# Patient Record
Sex: MALE | Race: WHITE | NOT HISPANIC OR LATINO | Employment: OTHER | URBAN - METROPOLITAN AREA
[De-identification: names, ages, dates, MRNs, and addresses within clinical notes are randomized per-mention and may not be internally consistent; named-entity substitution may affect disease eponyms.]

---

## 2022-02-10 ENCOUNTER — APPOINTMENT (OUTPATIENT)
Dept: CT IMAGING | Facility: CLINIC | Age: 72
DRG: 536 | End: 2022-02-10
Attending: PHYSICIAN ASSISTANT
Payer: MEDICARE

## 2022-02-10 ENCOUNTER — APPOINTMENT (OUTPATIENT)
Dept: GENERAL RADIOLOGY | Facility: CLINIC | Age: 72
DRG: 536 | End: 2022-02-10
Attending: PHYSICIAN ASSISTANT
Payer: MEDICARE

## 2022-02-10 ENCOUNTER — HOSPITAL ENCOUNTER (INPATIENT)
Facility: CLINIC | Age: 72
LOS: 4 days | Discharge: NURSING, REHAB OR LONG-TERM CARE | DRG: 536 | End: 2022-02-14
Attending: PHYSICIAN ASSISTANT | Admitting: STUDENT IN AN ORGANIZED HEALTH CARE EDUCATION/TRAINING PROGRAM
Payer: MEDICARE

## 2022-02-10 DIAGNOSIS — S72.112A CLOSED DISPLACED FRACTURE OF GREATER TROCHANTER OF LEFT FEMUR, INITIAL ENCOUNTER (H): ICD-10-CM
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
## 2022-02-10 DIAGNOSIS — F41.1 GENERALIZED ANXIETY DISORDER: Primary | ICD-10-CM

## 2022-02-10 PROBLEM — F33.8 SEASONAL AFFECTIVE DISORDER (H): Status: ACTIVE | Noted: 2018-01-22

## 2022-02-10 PROBLEM — C61 MALIGNANT NEOPLASM OF PROSTATE (H): Status: ACTIVE | Noted: 2019-11-11

## 2022-02-10 PROBLEM — S32.000A COMPRESSION FRACTURE OF LUMBAR VERTEBRA (H): Status: ACTIVE | Noted: 2017-03-15

## 2022-02-10 PROBLEM — N50.1 MALE PELVIC HEMATOMA: Status: ACTIVE | Noted: 2020-02-26

## 2022-02-10 PROBLEM — G90.9 AUTONOMIC NEUROPATHY: Status: ACTIVE | Noted: 2018-05-07

## 2022-02-10 PROBLEM — G47.09 OTHER INSOMNIA: Status: ACTIVE | Noted: 2018-11-06

## 2022-02-10 PROBLEM — M48.061 SPINAL STENOSIS OF LUMBAR REGION: Status: ACTIVE | Noted: 2017-03-15

## 2022-02-10 LAB
ANION GAP SERPL CALCULATED.3IONS-SCNC: 5 MMOL/L (ref 3–14)
BASOPHILS # BLD AUTO: 0 10E3/UL (ref 0–0.2)
BASOPHILS NFR BLD AUTO: 1 %
BUN SERPL-MCNC: 16 MG/DL (ref 7–30)
CALCIUM SERPL-MCNC: 8.9 MG/DL (ref 8.5–10.1)
CHLORIDE BLD-SCNC: 99 MMOL/L (ref 94–109)
CO2 SERPL-SCNC: 29 MMOL/L (ref 20–32)
CREAT SERPL-MCNC: 0.7 MG/DL (ref 0.66–1.25)
EOSINOPHIL # BLD AUTO: 0.1 10E3/UL (ref 0–0.7)
EOSINOPHIL NFR BLD AUTO: 1 %
ERYTHROCYTE [DISTWIDTH] IN BLOOD BY AUTOMATED COUNT: 11.8 % (ref 10–15)
GFR SERPL CREATININE-BSD FRML MDRD: >90 ML/MIN/1.73M2
GLUCOSE BLD-MCNC: 97 MG/DL (ref 70–99)
HCT VFR BLD AUTO: 40.1 % (ref 40–53)
HGB BLD-MCNC: 13.5 G/DL (ref 13.3–17.7)
IMM GRANULOCYTES # BLD: 0 10E3/UL
IMM GRANULOCYTES NFR BLD: 0 %
LYMPHOCYTES # BLD AUTO: 0.8 10E3/UL (ref 0.8–5.3)
LYMPHOCYTES NFR BLD AUTO: 11 %
MCH RBC QN AUTO: 31.5 PG (ref 26.5–33)
MCHC RBC AUTO-ENTMCNC: 33.7 G/DL (ref 31.5–36.5)
MCV RBC AUTO: 94 FL (ref 78–100)
MONOCYTES # BLD AUTO: 0.5 10E3/UL (ref 0–1.3)
MONOCYTES NFR BLD AUTO: 7 %
NEUTROPHILS # BLD AUTO: 6.1 10E3/UL (ref 1.6–8.3)
NEUTROPHILS NFR BLD AUTO: 80 %
NRBC # BLD AUTO: 0 10E3/UL
NRBC BLD AUTO-RTO: 0 /100
PLATELET # BLD AUTO: 199 10E3/UL (ref 150–450)
POTASSIUM BLD-SCNC: 4.1 MMOL/L (ref 3.4–5.3)
RBC # BLD AUTO: 4.28 10E6/UL (ref 4.4–5.9)
SARS-COV-2 RNA RESP QL NAA+PROBE: NEGATIVE
SODIUM SERPL-SCNC: 133 MMOL/L (ref 133–144)
WBC # BLD AUTO: 7.5 10E3/UL (ref 4–11)

## 2022-02-10 PROCEDURE — 72100 X-RAY EXAM L-S SPINE 2/3 VWS: CPT

## 2022-02-10 PROCEDURE — 80048 BASIC METABOLIC PNL TOTAL CA: CPT | Performed by: PHYSICIAN ASSISTANT

## 2022-02-10 PROCEDURE — C9803 HOPD COVID-19 SPEC COLLECT: HCPCS

## 2022-02-10 PROCEDURE — 250N000013 HC RX MED GY IP 250 OP 250 PS 637: Performed by: STUDENT IN AN ORGANIZED HEALTH CARE EDUCATION/TRAINING PROGRAM

## 2022-02-10 PROCEDURE — 99220 PR INITIAL OBSERVATION CARE,LEVEL III: CPT | Performed by: STUDENT IN AN ORGANIZED HEALTH CARE EDUCATION/TRAINING PROGRAM

## 2022-02-10 PROCEDURE — 72192 CT PELVIS W/O DYE: CPT

## 2022-02-10 PROCEDURE — 87635 SARS-COV-2 COVID-19 AMP PRB: CPT | Performed by: PHYSICIAN ASSISTANT

## 2022-02-10 PROCEDURE — G0378 HOSPITAL OBSERVATION PER HR: HCPCS

## 2022-02-10 PROCEDURE — 36415 COLL VENOUS BLD VENIPUNCTURE: CPT | Performed by: PHYSICIAN ASSISTANT

## 2022-02-10 PROCEDURE — 120N000001 HC R&B MED SURG/OB

## 2022-02-10 PROCEDURE — 99285 EMERGENCY DEPT VISIT HI MDM: CPT | Mod: 25

## 2022-02-10 PROCEDURE — 73502 X-RAY EXAM HIP UNI 2-3 VIEWS: CPT

## 2022-02-10 PROCEDURE — 85025 COMPLETE CBC W/AUTO DIFF WBC: CPT | Performed by: PHYSICIAN ASSISTANT

## 2022-02-10 RX ORDER — METHOCARBAMOL 500 MG/1
1000 TABLET, FILM COATED ORAL AT BEDTIME
Status: DISCONTINUED | OUTPATIENT
Start: 2022-02-10 | End: 2022-02-11

## 2022-02-10 RX ORDER — ACETAMINOPHEN 650 MG/1
650 SUPPOSITORY RECTAL EVERY 6 HOURS PRN
Status: DISCONTINUED | OUTPATIENT
Start: 2022-02-10 | End: 2022-02-14 | Stop reason: HOSPADM

## 2022-02-10 RX ORDER — ONDANSETRON 2 MG/ML
4 INJECTION INTRAMUSCULAR; INTRAVENOUS EVERY 6 HOURS PRN
Status: DISCONTINUED | OUTPATIENT
Start: 2022-02-10 | End: 2022-02-14 | Stop reason: HOSPADM

## 2022-02-10 RX ORDER — METHOCARBAMOL 500 MG/1
1000 TABLET, FILM COATED ORAL AT BEDTIME
Status: ON HOLD | COMMUNITY
End: 2022-02-14

## 2022-02-10 RX ORDER — CLONAZEPAM 0.5 MG/1
.5-1 TABLET ORAL AT BEDTIME
Status: DISCONTINUED | OUTPATIENT
Start: 2022-02-10 | End: 2022-02-14 | Stop reason: HOSPADM

## 2022-02-10 RX ORDER — NALOXONE HYDROCHLORIDE 0.4 MG/ML
0.4 INJECTION, SOLUTION INTRAMUSCULAR; INTRAVENOUS; SUBCUTANEOUS
Status: DISCONTINUED | OUTPATIENT
Start: 2022-02-10 | End: 2022-02-14 | Stop reason: HOSPADM

## 2022-02-10 RX ORDER — ACETAMINOPHEN 325 MG/1
650 TABLET ORAL EVERY 6 HOURS PRN
Status: DISCONTINUED | OUTPATIENT
Start: 2022-02-10 | End: 2022-02-14 | Stop reason: HOSPADM

## 2022-02-10 RX ORDER — CLONAZEPAM 0.5 MG/1
.5-1 TABLET ORAL AT BEDTIME
Status: ON HOLD | COMMUNITY
End: 2022-02-12

## 2022-02-10 RX ORDER — ONDANSETRON 4 MG/1
4 TABLET, ORALLY DISINTEGRATING ORAL EVERY 6 HOURS PRN
Status: DISCONTINUED | OUTPATIENT
Start: 2022-02-10 | End: 2022-02-14 | Stop reason: HOSPADM

## 2022-02-10 RX ORDER — NALOXONE HYDROCHLORIDE 0.4 MG/ML
0.2 INJECTION, SOLUTION INTRAMUSCULAR; INTRAVENOUS; SUBCUTANEOUS
Status: DISCONTINUED | OUTPATIENT
Start: 2022-02-10 | End: 2022-02-14 | Stop reason: HOSPADM

## 2022-02-10 RX ORDER — LIDOCAINE 40 MG/G
CREAM TOPICAL
Status: DISCONTINUED | OUTPATIENT
Start: 2022-02-10 | End: 2022-02-14 | Stop reason: HOSPADM

## 2022-02-10 RX ORDER — NORTRIPTYLINE HYDROCHLORIDE 50 MG/1
150 CAPSULE ORAL AT BEDTIME
Status: DISCONTINUED | OUTPATIENT
Start: 2022-02-10 | End: 2022-02-14 | Stop reason: HOSPADM

## 2022-02-10 RX ORDER — NORTRIPTYLINE HYDROCHLORIDE 75 MG/1
150 CAPSULE ORAL AT BEDTIME
COMMUNITY

## 2022-02-10 RX ORDER — OXYCODONE HYDROCHLORIDE 5 MG/1
5 TABLET ORAL EVERY 4 HOURS PRN
Status: DISCONTINUED | OUTPATIENT
Start: 2022-02-10 | End: 2022-02-14

## 2022-02-10 RX ADMIN — METHOCARBAMOL 1000 MG: 500 TABLET ORAL at 23:09

## 2022-02-10 RX ADMIN — CLONAZEPAM 1 MG: 0.5 TABLET ORAL at 01:27

## 2022-02-10 RX ADMIN — ACETAMINOPHEN 650 MG: 325 TABLET, FILM COATED ORAL at 23:09

## 2022-02-10 ASSESSMENT — ENCOUNTER SYMPTOMS
HEADACHES: 0
NUMBNESS: 0
WEAKNESS: 0
NECK PAIN: 0
BACK PAIN: 1

## 2022-02-11 ENCOUNTER — APPOINTMENT (OUTPATIENT)
Dept: PHYSICAL THERAPY | Facility: CLINIC | Age: 72
DRG: 536 | End: 2022-02-11
Attending: STUDENT IN AN ORGANIZED HEALTH CARE EDUCATION/TRAINING PROGRAM
Payer: MEDICARE

## 2022-02-11 PROCEDURE — 99226 PR SUBSEQUENT OBSERVATION CARE,LEVEL III: CPT | Performed by: HOSPITALIST

## 2022-02-11 PROCEDURE — G0378 HOSPITAL OBSERVATION PER HR: HCPCS

## 2022-02-11 PROCEDURE — 97161 PT EVAL LOW COMPLEX 20 MIN: CPT | Mod: GP

## 2022-02-11 PROCEDURE — 250N000013 HC RX MED GY IP 250 OP 250 PS 637: Performed by: HOSPITALIST

## 2022-02-11 PROCEDURE — 97530 THERAPEUTIC ACTIVITIES: CPT | Mod: GP

## 2022-02-11 PROCEDURE — 120N000001 HC R&B MED SURG/OB

## 2022-02-11 PROCEDURE — 250N000013 HC RX MED GY IP 250 OP 250 PS 637: Performed by: STUDENT IN AN ORGANIZED HEALTH CARE EDUCATION/TRAINING PROGRAM

## 2022-02-11 PROCEDURE — 93005 ELECTROCARDIOGRAM TRACING: CPT

## 2022-02-11 RX ORDER — METHOCARBAMOL 500 MG/1
500 TABLET, FILM COATED ORAL 4 TIMES DAILY
Status: DISCONTINUED | OUTPATIENT
Start: 2022-02-11 | End: 2022-02-11

## 2022-02-11 RX ORDER — METHOCARBAMOL 500 MG/1
500 TABLET, FILM COATED ORAL
Status: COMPLETED | OUTPATIENT
Start: 2022-02-11 | End: 2022-02-11

## 2022-02-11 RX ORDER — METHOCARBAMOL 500 MG/1
500 TABLET, FILM COATED ORAL 4 TIMES DAILY PRN
Status: COMPLETED | OUTPATIENT
Start: 2022-02-11 | End: 2022-02-11

## 2022-02-11 RX ORDER — METHOCARBAMOL 500 MG/1
500 TABLET, FILM COATED ORAL 3 TIMES DAILY PRN
Status: DISCONTINUED | OUTPATIENT
Start: 2022-02-11 | End: 2022-02-12

## 2022-02-11 RX ORDER — LIDOCAINE 4 G/G
1 PATCH TOPICAL
Status: DISCONTINUED | OUTPATIENT
Start: 2022-02-11 | End: 2022-02-14 | Stop reason: HOSPADM

## 2022-02-11 RX ADMIN — METHOCARBAMOL 500 MG: 500 TABLET ORAL at 10:04

## 2022-02-11 RX ADMIN — OXYCODONE HYDROCHLORIDE 5 MG: 5 TABLET ORAL at 11:04

## 2022-02-11 RX ADMIN — OXYCODONE HYDROCHLORIDE 5 MG: 5 TABLET ORAL at 15:15

## 2022-02-11 RX ADMIN — LIDOCAINE 1 PATCH: 246 PATCH TOPICAL at 10:03

## 2022-02-11 RX ADMIN — OXYCODONE HYDROCHLORIDE 5 MG: 5 TABLET ORAL at 00:02

## 2022-02-11 RX ADMIN — METHOCARBAMOL 500 MG: 500 TABLET ORAL at 18:06

## 2022-02-11 RX ADMIN — OXYCODONE HYDROCHLORIDE 5 MG: 5 TABLET ORAL at 06:42

## 2022-02-11 RX ADMIN — ACETAMINOPHEN 650 MG: 325 TABLET, FILM COATED ORAL at 11:08

## 2022-02-11 RX ADMIN — NORTRIPTYLINE HYDROCHLORIDE 150 MG: 50 CAPSULE ORAL at 01:26

## 2022-02-11 RX ADMIN — OXYCODONE HYDROCHLORIDE 5 MG: 5 TABLET ORAL at 22:26

## 2022-02-11 NOTE — ED PROVIDER NOTES
History     Chief Complaint:  Fall (pt biba from home - slipped and fell on ice and hour PTA - c/o left hip pain, unable to bear weight. given 1mg dilaudid ivp en route. denies hitting head or loc. denies bloodthinners)       HPI   Reji Finley is a 71 year old male who presents to the emergency department after slipping on the ice and sustaining injury to his left hip.  He reports left lower back pain and left hip pain.  He reports he had neighbors come and help him get into the house and then has been unable to bear weight on the left lower extremity since that time and called EMS.  He received 1 mg of Dilaudid in route and pain improved.  He denies striking his head or loss of consciousness.  He denies neck pain.  He denies other injuries.    Allergies:  No Known Allergies     Medications:    clonazePAM (KLONOPIN) 0.5 MG tablet  medical cannabis (Patient's own supply)  methocarbamol (ROBAXIN) 500 MG tablet  nortriptyline (PAMELOR) 75 MG capsule        Past Medical History:    Past Medical History:   Diagnosis Date     Anxiety      Chronic leg pain      Peripheral neuropathy      Plantar fasciitis        Patient Active Problem List    Diagnosis Date Noted     Closed displaced fracture of greater trochanter of left femur, initial encounter (H) 02/10/2022     Priority: Medium     Fall due to slipping on ice or snow, initial encounter 02/10/2022     Priority: Medium        Past Surgical History:    Past Surgical History:   Procedure Laterality Date     COLONOSCOPY          Family History:    family history is not on file.    Social History:  Presents via EMS  PCP: Cece Vazquez     Review of Systems   Musculoskeletal: Positive for back pain (left low). Negative for neck pain.        (+) left hip pain   Neurological: Negative for syncope, weakness, numbness and headaches.   All other systems reviewed and are negative.      Physical Exam     Patient Vitals for the past 24 hrs:   BP Temp Temp src Pulse Resp  SpO2 Weight   02/10/22 1811 (!) 178/104 -- -- -- -- -- --   02/10/22 1809 -- 97.7  F (36.5  C) Oral 60 16 99 % 61.2 kg (135 lb)        Physical Exam  General: Alert, cooperative   Head:  Scalp is atraumatic.  Neck:  Normal range of motion.   CV:  Normal rate. No murmur. 2+ DT and PT pulses  Resp:  Breath sounds are clear bilaterally. Non-labored, no retractions or accessory muscle use.  GI:  Abdomen is soft, no distension, no tenderness.   MS:   Diffuse tenderness to the lateral left hip.  No overlying erythema or ecchymosis. No deformity. No midline lumbar tenderness.  Able to roll left lower leg without significant hip pain.  Skin:  Warm and dry. No rash.   Neuro:  Alert. Strength and sensation grossly intact. 5/5 strength with dorsiflexion and plantarflexion.  Sensation intact to bilateral lower extremities.  Psych:  Awake. Alert.  Appropriate interactions.      Emergency Department Course     Imaging:      CT Pelvis Bone wo Contrast   Final Result   IMPRESSION:   1.  Minimally displaced comminuted fracture of the left greater trochanter.   2.  Degenerative changes in the visualized lumbar spine with central spinal stenosis and lateral recess stenosis at L4-L5 and L5-S1.      Lumbar spine XR, 2-3 views   Final Result   IMPRESSION: Chronic L1 osteoporotic fracture with 30% height loss with anterior wedging, status post vertebroplasty. Vertebral body heights otherwise preserved. Normal alignment. Severe degenerative disc disease at L5-S1. Moderate colonic loading;    correlate clinically for constipation.      XR Pelvis w Hip Left 1 View   Final Result   IMPRESSION: Partially evaluated degenerative changes in the lumbar spine. Otherwise negative. No evidence of fracture.             Emergency Department Course:  Past medical records, nursing notes, and vitals reviewed.  I performed an exam of the patient and obtained history, as documented above.    Consulted Dr. Brower of orthopedics.    Findings and plan  explained to the patient who consents to admission. Discussed the patient with , who will admit the patient to a medical bed for further monitoring, evaluation, and treatment.     Impression & Plan      Medical Decision Making:  Reji Finley is a 71 year old male presents to the emergency department after slipping on ice and falling onto his left side.  He has been unable to bear weight prompted arrival to the emergency room.  X-ray without evidence of fracture.  Proceed with CT given the patient's inability to bear weight.  CT revealed a greater trochanter fracture.  Distal CMS intact.  He did not strike his head.  No other injuries.  Consulted Dr. Brower  of orthopedics who recommended nonsurgical care.  Given the patient lives independently, we will admit for pain management and physical therapy consult.  Patient agrees with this plan and all questions and concerns addressed prior to admission.    Diagnosis:    ICD-10-CM    1. Closed displaced fracture of greater trochanter of left femur, initial encounter (H)  S72.112A    2. Fall due to slipping on ice or snow, initial encounter  W00.9XXA         Discharge Medications:     Medication List      ASK your doctor about these medications    clonazePAM 0.5 MG tablet  Commonly known as: klonoPIN  Ask about: Which instructions should I use?             2/10/2022   Daysi Dodge PA-C, PA-C  02/10/22 2106       Daysi Quinn PA-C  02/10/22 2106

## 2022-02-11 NOTE — PROGRESS NOTES
02/11/22 1300   Quick Adds   Type of Visit Initial PT Evaluation   Living Environment   People in home alone   Current Living Arrangements house  (2 story)   Home Accessibility stairs within home   Number of Stairs, Main Entrance none   Number of Stairs, Within Home, Primary greater than 10 stairs  (20 stairs to bedroom upstairs)   Stair Railings, Within Home, Primary   (handrails only USP)   Transportation Anticipated family or friend will provide   Living Environment Comments pt will need to use stairs to get to bedroom, brother in town but no help 24/7    Self-Care   Usual Activity Tolerance good   Current Activity Tolerance moderate   Regular Exercise Yes   Exercise Amount/Frequency daily   Equipment Currently Used at Home none   Activity/Exercise/Self-Care Comment prior to admission pt was ind with all functional mobility, ADL's and IADL's    Disability/Function   Fall history within last six months yes   Number of times patient has fallen within last six months 2   Change in Functional Status Since Onset of Current Illness/Injury yes   General Information   Onset of Illness/Injury or Date of Surgery 02/10/22   Referring Physician Sergio Lamar MD   Patient/Family Therapy Goals Statement (PT) get walking again and hopefully return home   Pertinent History of Current Problem (include personal factors and/or comorbidities that impact the POC) Pt is 71 year old male admitted on 2/10/2022. He presents with hip pain after mechanical fall, Closed displaced fracture of greater trochanter of left femur   Existing Precautions/Restrictions fall   Cognition   Orientation Status (Cognition) oriented x 3   Affect/Mental Status (Cognition) WNL   Follows Commands (Cognition) WNL   Pain Assessment   Patient Currently in Pain   (Left hip pain)   Integumentary/Edema   Integumentary/Edema Comments healing scabs on Left arm from fall 10 days ago   Posture    Posture Forward head position;Protracted shoulders   Range of  Motion (ROM)   ROM Quick Adds ROM deficits secondary to pain   ROM Comment deficits with L hip secondary to pain, otherwise appears grossly WFL    Strength   Manual Muscle Testing Quick Adds Able to perform R SLR;Deficits observed during functional mobility   Strength Comments not formally assessed, deficits with LLE secondary to left hip pain from fx, rest of body appears grossly anti-gravity   Bed Mobility   Comment (Bed Mobility) with HOB elevated, supine<>sit ModA    Transfers   Transfer Safety Comments sit<>stand with FWW and Nghia    Gait/Stairs (Locomotion)   Comment (Gait/Stairs) pt unable to ambulate at this time   Balance   Balance Comments sitting EOB with use of bilat UE, standing with FWW unsteady    Sensory Examination   Sensory Perception Comments neuropathy and decreased sensation in bilat feet   Clinical Impression   Criteria for Skilled Therapeutic Intervention yes, treatment indicated   PT Diagnosis (PT) impaired gait   Influenced by the following impairments deficits with functional ROM, strength and balance, pain, all secondary to left hip greater trochanter fx    Functional limitations due to impairments decreased ind with all functional mobility, decreased activity tolerance, fall risk    Clinical Presentation Stable/Uncomplicated   Clinical Presentation Rationale PMH and clinical judgment    Clinical Decision Making (Complexity) low complexity   Therapy Frequency (PT) 5x/week   Predicted Duration of Therapy Intervention (days/wks) 4 days    Planned Therapy Interventions (PT) balance training;bed mobility training;cryotherapy;gait training;home exercise program;ROM (range of motion);stair training;strengthening;stretching;patient/family education;transfer training;progressive activity/exercise   Anticipated Equipment Needs at Discharge (PT) walker, rolling   Risk & Benefits of therapy have been explained evaluation/treatment results reviewed;care plan/treatment goals reviewed;risks/benefits  reviewed;current/potential barriers reviewed;participants voiced agreement with care plan;participants included;patient   PT Discharge Planning    PT Discharge Recommendation (DC Rec) home with assist;home with home care physical therapy;Transitional Care Facility   PT Rationale for DC Rec Pt is currently below baseline with deficits in functional ROM, strength, balance, and pain all s/p left hip fx, and all contributing to decreased functional mobility. Pt is also orthostatic and has increased dizziness with all position changes. Pt is currently not ambulating and is an assist of 1 for all functional mobility including bed mobility and sit<>stand with FWW, and pt is a fall risk. Pt lives alone and would not be able to have help 24/7 which he would require at this time. Recommend TCU to progress pt ind with mobility and safety prior to returning to previous living situation. If pt were to return home would require assist of 1-2 for 24/7 and possibly wc, and HHPT to continue to progress functional mobility and because of increased burden to leave home at this time.     PT Brief overview of current status  bed mobility: ModA, sit<>stand with FWW and Nghia    Total Evaluation Time   Total Evaluation Time (Minutes) 10

## 2022-02-11 NOTE — PHARMACY-ADMISSION MEDICATION HISTORY
Pharmacy Medication History  Admission medication history interview status for the 2/10/2022 admission is complete. See EPIC admission navigator for prior to admission medications     Location of Interview: Patient room  Medication history sources: Patient    Significant changes made to the medication list:  Added all current meds    In the past week, patient estimated taking medication this percent of the time: greater than 90%    Additional medication history information:       Medication reconciliation completed by provider prior to medication history? No    Time spent in this activity: 15 minutes    Prior to Admission medications    Medication Sig Last Dose Taking? Auth Provider   clonazePAM (KLONOPIN) 0.5 MG tablet Take 0.5-1 mg by mouth At Bedtime 2/9/2022 at HS Yes Unknown, Entered By History   medical cannabis (Patient's own supply) See Admin Instructions (The purpose of this order is to document that the patient reports taking medical cannabis.  This is not a prescription, and is not used to certify that the patient has a qualifying medical condition.)  at PRN Yes Unknown, Entered By History   methocarbamol (ROBAXIN) 500 MG tablet Take 1,000 mg by mouth At Bedtime 2/9/2022 at Unknown time Yes Unknown, Entered By History   nortriptyline (PAMELOR) 75 MG capsule Take 150 mg by mouth At Bedtime 2/9/2022 at HS Yes Unknown, Entered By History       The information provided in this note is only as accurate as the sources available at the time of update(s)

## 2022-02-11 NOTE — PROGRESS NOTES
Observation Goals  -diagnostic tests and consults completed and resulted: yes  -vital signs normal or at patient baseline: yes  -tolerating oral intake to maintain hydration: yes   -adequate pain control on oral analgesics: pending

## 2022-02-11 NOTE — PROVIDER NOTIFICATION
Paged Dr. Tate about PRN roboxin for patient, leg spasms are continuing and the roboxin is helping.    Dr. Tate put in PRN orders

## 2022-02-11 NOTE — PLAN OF CARE
-diagnostic tests and consults completed and resulted: not met  -vital signs normal or at patient baseline : met  -tolerating oral intake to maintain hydration: met  -adequate pain control on oral analgesics: partially met. Pt still having pain when moves, but improving slowly with PRN pain meds.   Nurse to notify provider when observation goals have been met and patient is ready for discharge.    New admit   Pt slipped on ice and fx L) hip, having L) hip pain, unable to bear weight on it. Found to have degeneraive changes to lumbar spine with spinal stenosis and laternal recess steonsis of L4-l5 and L5-S1. A+Ox4. Ax2 with lift due to pain. Pain controlled with ice, PRN oxycodonex2 and tylenol x1. Pt repositions self slowly.     Will continue to monitor

## 2022-02-11 NOTE — PROVIDER NOTIFICATION
MD Notification    Notified Person: MD    Notified Person Name: ALEIDA SZYMANSKI MD    Notification Date/Time:0203    Notification Interaction:AMCOM    Purpose of Notification: Pt requesting for lidocaine patch for chronic back pain     Orders Received: none    Comments:

## 2022-02-11 NOTE — H&P
[facility]    History and Physical - Hospitalist Service       Date of Admission:  2/10/2022    Assessment & Plan      Reji Finley is a 71 year old male admitted on 2/10/2022. He presents with hip pain.       Closed displaced fracture of greater trochanter of left femur, initial encounter (H)    Fall due to slipping on ice or snow, initial encounter    Assessment: Presents with left hip pain in the setting of a mechanical fall.  CT pelvis shows minimally displaced comminuted fracture of the left greater trochanter.  Degenerative changes in the visualized lumbar spine with central spinal stenosis and lateral recess stenosis at L4-L5 and L5-S1.  Orthopedic surgery was consulted in emergency department, preliminary plan is nonsurgical at this time.    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      Seasonal affective disorder (H)    Other insomnia    Generalized anxiety disorder    Major depressive disorder, recurrent episode (H)    Assessment/Plan: Continue prior to admission nortriptyline and Klonopin     Chronic leg pain  Assessment/plan: Continue prior to admission Robaxin once verified         Diet:   Regular Diet  DVT Prophylaxis: Pneumatic Compression Devices  Koehler Catheter: Not present  Central Lines: None  Cardiac Monitoring: None  Code Status:   FULL CODE    Clinically Significant Risk Factors Present on Admission                     Disposition Plan   Expected Discharge:    Anticipated discharge location:  Awaiting care coordination huddle  Delays:            The patient's care was discussed with the Patient and ED Provider.    Sergio Lamar MD  Hospitalist Service  [facility]  Securely message with the Vocera Web Console (learn more here)  Text page via [employer] Paging/Directory          ______________________________________________________________________    Chief Complaint     Hip Pain    History is obtained from the patient    History of Present Illness      Reji Finley is a 71 year old male with past medical history of anxiety, major depression, chronic leg pain, peripheral neuropathy who presents for evaluation of hip pain.    Patient reports that he was ambulating when he slipped and fell on ice on the day admission and following his fall had significant difficulties bearing weight.  He denies any loss of consciousness, he denies any head trauma, he denies being on any anticoagulation.  Ultimately given his significant pain and difficulty ambulating, he activated EMS for further assistance and evaluation.  He denies any recent chest pain or shortness of breath.  He denies any nausea/vomiting or abdominal pain.  He denies any slurred speech, localized weakness or numbness of his extremities.  He denies any bloody stools, weight loss or night sweats.  He otherwise has no other complaints this time.    Review of Systems      The 10 point Review of Systems is negative other than noted in the HPI or here.     Past Medical History      I have reviewed this patient's medical history and updated it with pertinent information if needed.   Past Medical History:   Diagnosis Date     Anxiety      Chronic leg pain      Peripheral neuropathy      Plantar fasciitis        Past Surgical History   I have reviewed this patient's surgical history and updated it with pertinent information if needed.  Past Surgical History:   Procedure Laterality Date     COLONOSCOPY         Social History   I have reviewed this patient's social history and updated it with pertinent information if needed.  Social History     Tobacco Use     Smoking status: Never Smoker     Smokeless tobacco: None   Substance Use Topics     Alcohol use: Yes     Comment: Occasionally     Drug use: No       Family History   I have  reviewed this patient's family history and updated it with pertinent information if needed.  Family History   Problem Relation Age of Onset     No Known Problems Mother      No Known Problems Father      Prior to Admission Medications   Prior to Admission Medications   Prescriptions Last Dose Informant Patient Reported? Taking?   clonazePAM (KLONOPIN) 0.5 MG tablet 2/9/2022 at HS Self Yes Yes   Sig: Take 0.5-1 mg by mouth At Bedtime   medical cannabis (Patient's own supply)  at PRN Self Yes Yes   Sig: See Admin Instructions (The purpose of this order is to document that the patient reports taking medical cannabis.  This is not a prescription, and is not used to certify that the patient has a qualifying medical condition.)   methocarbamol (ROBAXIN) 500 MG tablet 2/9/2022 at Unknown time Self Yes Yes   Sig: Take 1,000 mg by mouth At Bedtime   nortriptyline (PAMELOR) 75 MG capsule 2/9/2022 at HS Self Yes Yes   Sig: Take 150 mg by mouth At Bedtime      Facility-Administered Medications: None     Allergies   No Known Allergies    Physical Exam   Vital Signs: Temp: 97.7  F (36.5  C) Temp src: Oral BP: (!) 178/104 Pulse: 60   Resp: 16 SpO2: 99 %      Weight: 135 lbs 0 oz    Constitutional: awake, alert, cooperative, no apparent distress.   Eyes: Lids and lashes normal, pupils equal, round and reactive to light   ENT: Normocephalic, without obvious abnormality, atraumatic, sinuses nontender on palpation   Hematologic / Lymphatic: no cervical lymphadenopathy   Respiratory: CTABL   Cardiovascular: RRR with no m/r/g   GI: Normal bowel sounds, soft, non-distended, non-tender.   Skin: normal skin color, texture, turgor   Musculoskeletal: There is no redness, warmth, or swelling of the joints. Full range of motion noted.   Neurologic: Awake, alert, oriented to name, place and time. Cranial nerves II-XII are grossly intact. Motor is 5 out of 5 bilaterally. Sensory is intact.   Neuropsychiatric: normal mood and affect      Data    Data reviewed today: I reviewed all medications, new labs and imaging results over the last 24 hours. I personally reviewed the CT Pelvis / XR Pelvis / Lumbar Spine image(s) showing see below.    Most Recent 3 CBC's:Recent Labs   Lab Test 02/10/22  2024   WBC 7.5   HGB 13.5   MCV 94        Most Recent 3 BMP's:Recent Labs   Lab Test 02/10/22  2024      POTASSIUM 4.1   CHLORIDE 99   CO2 29   BUN 16   CR 0.70   ANIONGAP 5   RADHA 8.9   GLC 97     Most Recent 2 LFT's:No lab results found.  Most Recent 3 INR's:No lab results found.  Recent Results (from the past 24 hour(s))   XR Pelvis w Hip Left 1 View    Narrative    EXAM: XR PELVIS AND HIP LEFT 1 VIEW  LOCATION: [facility]  DATE/TIME: 2/10/2022 6:24 PM    INDICATION: Fall, left hip pain.  COMPARISON: None.      Impression    IMPRESSION: Partially evaluated degenerative changes in the lumbar spine. Otherwise negative. No evidence of fracture.   Lumbar spine XR, 2-3 views    Narrative    EXAM: XR LUMBAR SPINE 2-3 VIEWS  LOCATION: [facility]  DATE/TIME: 2/10/2022 6:24 PM    INDICATION: fall, left posterior back pain  COMPARISON: None.  TECHNIQUE: CR Lumbar Spine.      Impression    IMPRESSION: Chronic L1 osteoporotic fracture with 30% height loss with anterior wedging, status post vertebroplasty. Vertebral body heights otherwise preserved. Normal alignment. Severe degenerative disc disease at L5-S1. Moderate colonic loading;   correlate clinically for constipation.   CT Pelvis Bone wo Contrast    Narrative    EXAM: CT PELVIS BONE WO CONTRAST  LOCATION: [facility]  DATE/TIME: 2/10/2022 7:37 PM    INDICATION: Hip trauma, fracture suspected, negative x-ray.  COMPARISON: 2/10/2022 radiographs.  TECHNIQUE: CT scan of the pelvis was performed without IV contrast. Multiplanar reformats were obtained. Dose reduction techniques were used.  CONTRAST: None.    FINDINGS:    BONES AND JOINTS:  There is a minimally displaced comminuted fracture of the left greater trochanter. This does not appear to involve the intertrochanteric region or the femoral neck. There is up to 2 mm of displacement. No angulation.    There are moderate to severe degenerative changes in the visualized lumbar spine with central spinal stenosis and lateral recess stenosis at L4-L5 and L5-S1.    SOFT TISSUES: No intrapelvic soft tissue abnormalities are evident. No hematoma, cyst or mass. There is edema adjacent to the fracture. No gross muscle or tendon pathology.      Impression    IMPRESSION:  1.  Minimally displaced comminuted fracture of the left greater trochanter.  2.  Degenerative changes in the visualized lumbar spine with central spinal stenosis and lateral recess stenosis at L4-L5 and L5-S1.

## 2022-02-11 NOTE — PROVIDER NOTIFICATION
Paged Dr. Tate about a one time dose or PRN roboxin for leg spasms.     Ordered a one time dose of roboxin

## 2022-02-11 NOTE — CODE/RAPID RESPONSE
Brief House Note    RRT paged for episode of syncope when patient was getting out of bed with staff. Patient was wearing gait belt and using walker with 2 staff members when he became light headed and lost consciousness briefly. Staff assisted him back to bed where he roused. Patient stated that his pain while in bed was 7/10 and when he was getting up it was more like 10/10. Patient endorses history of syncopal episodes. Patient denies chest pain, dizziness, light-headedness, abdominal pain, numbness or tingling (outside of his baseline peripheral neuropathy). At the time of this note the patient is back to his baseline with no complaints other than his LLE pain. Performed EKG and encouraged PO pain medication regimen so this does not recur when he is out of bed. No focal neurological deficits.    Vaso-vagal syncope    Plan:  --administer PRN pain medications  --12-lead EKG = SB 59bpm no ischemic changes

## 2022-02-11 NOTE — ED TRIAGE NOTES
pt biba from home - slipped and fell on ice and hour PTA - c/o left hip pain, unable to bear weight. given 1mg dilaudid ivp en route. denies hitting head or loc. denies bloodthinners

## 2022-02-11 NOTE — PROGRESS NOTES
RECEIVING UNIT ED HANDOFF REVIEW    ED Nurse Handoff Report was reviewed by: Sandra Valencia RN on February 10, 2022 at 9:51 PM

## 2022-02-11 NOTE — CONSULTS
[facility]    Orthopedic Consultation    Reji Finley MRN# [id number]   Age: 71 year old YOB: 1950     Date of Admission:  2/10/2022    Reason for consult: Left trochanteric fracture       Requesting physician: Dr. Lamar       Level of consult: One-time consult to assist in determining a diagnosis, recommend an appropriate treatment plan and place orders           Assessment and Plan:   Assessment:   Left greater trochanteric femur fracture, non-displaced      Plan:   Non-operative treatment at this time  Protected WB with walker  No hip abduction, limit rotation  PT  Pain medication as needed, limit narcotics as able  Follow-up with [facility] specialist in 2 weeks, patient prefers [facility] [phone]           Chief Complaint:   Left hip pain         History of Present Illness:   This patient is a 71 year old male who presents with the following condition requiring a hospital admission:    Per ED: presents to the emergency department after slipping on the ice and sustaining injury to his left hip.  He reports left lower back pain and left hip pain.  He reports he had neighbors come and help him get into the house and then has been unable to bear weight on the left lower extremity since that time and called EMS.  He received 1 mg of Dilaudid in route and pain improved.  He denies striking his head or loss of consciousness.  He denies neck pain.  He denies other injuries.  He is otherwise healthy with no history of previous issues with hip or knees.   He is not chronically anticoagulated  He lives at home independently, alone.           Past Medical History:     Past Medical History:   Diagnosis Date     Anxiety      Chronic leg pain      Peripheral neuropathy      Plantar fasciitis              Past Surgical History:     Past Surgical History:   Procedure Laterality Date     COLONOSCOPY               Social History:     Social History     Tobacco Use     Smoking status:  Never Smoker     Smokeless tobacco: Not on file   Substance Use Topics     Alcohol use: Yes     Comment: Occasionally             Family History:     Family History   Problem Relation Age of Onset     No Known Problems Mother      No Known Problems Father              Immunizations:     VACCINE/DOSE   Diptheria   DPT   DTAP   HBIG   Hepatitis A   Hepatitis B   HIB   Influenza   Measles   Meningococcal   MMR   Mumps   Pneumococcal   Polio   Rubella   Small Pox   TDAP   Varicella   Zoster             Allergies:   No Known Allergies          Medications:     Current Facility-Administered Medications   Medication     acetaminophen (TYLENOL) tablet 650 mg    Or     acetaminophen (TYLENOL) Suppository 650 mg     clonazePAM (klonoPIN) tablet 0.5-1 mg     Lidocaine (LIDOCARE) 4 % Patch 1 patch     lidocaine (LMX4) cream     lidocaine 1 % 0.1-1 mL     lidocaine patch in PLACE     melatonin tablet 1 mg     methocarbamol (ROBAXIN) tablet 1,000 mg     naloxone (NARCAN) injection 0.2 mg    Or     naloxone (NARCAN) injection 0.4 mg    Or     naloxone (NARCAN) injection 0.2 mg    Or     naloxone (NARCAN) injection 0.4 mg     nortriptyline (PAMELOR) capsule 150 mg     ondansetron (ZOFRAN-ODT) ODT tab 4 mg    Or     ondansetron (ZOFRAN) injection 4 mg     oxyCODONE (ROXICODONE) tablet 5 mg     sodium chloride (PF) 0.9% PF flush 3 mL     sodium chloride (PF) 0.9% PF flush 3 mL             Review of Systems:   CV: NEGATIVE for chest pain, palpitations or peripheral edema  C: NEGATIVE for fever, chills, change in weight  E/M: NEGATIVE for ear, mouth and throat problems  R: NEGATIVE for significant cough or SOB          Physical Exam:   All vitals have been reviewed  Patient Vitals for the past 24 hrs:   BP Temp Temp src Pulse Resp SpO2 Weight   02/11/22 1512 105/58 98.8  F (37.1  C) Oral 62 18 97 % --   02/11/22 1057 110/78 -- -- 63 14 -- --   02/11/22 1054 114/68 -- -- 61 20 100 % --   02/11/22 0851 118/77 98.4  F (36.9  C) Oral 63 16  -- --   02/11/22 0642 -- -- -- -- -- -- 59.3 kg (130 lb 11.7 oz)   02/11/22 0641 110/69 98.1  F (36.7  C) Oral 72 18 98 % --   02/11/22 0001 (!) 154/95 98.7  F (37.1  C) Oral 64 16 98 % --   02/10/22 2238 (!) 158/104 -- -- -- 20 -- --   02/10/22 2236 -- 98.4  F (36.9  C) Oral 77 -- 98 % --   02/10/22 1811 (!) 178/104 -- -- -- -- -- --   02/10/22 1809 -- 97.7  F (36.5  C) Oral 60 16 99 % 61.2 kg (135 lb)       Intake/Output Summary (Last 24 hours) at 2/11/2022 1622  Last data filed at 2/11/2022 1355  Gross per 24 hour   Intake 1123 ml   Output 750 ml   Net 373 ml     Patient laying comfortably in bed   No ecchymosis or erythema over entirety of the left leg  No notable swelling or edema  Full ROM of left knee - 0 to 45 degrees; limited by position in bed and pain in hip  Stable to varus and valgus stress  No TTP over medial or lateral joint line  Hip flexes to 100 degrees  Internal rotation/External rotation produces lateral hip pain  TTP over great trochanter  Bilateral calves are soft, non-tender.  Bilateral lower extremity is NVI.  Sensation intact bilateral lower extremities  5/5 motor with resisted dorsi and plantar flexion bilaterally  5/5 hip flexors  5/5 EHL  +Dp pulse          Data:   All laboratory data reviewed  Results for orders placed or performed during the hospital encounter of 02/10/22   XR Pelvis w Hip Left 1 View     Status: None    Narrative    EXAM: XR PELVIS AND HIP LEFT 1 VIEW  LOCATION: [facility]  DATE/TIME: 2/10/2022 6:24 PM    INDICATION: Fall, left hip pain.  COMPARISON: None.      Impression    IMPRESSION: Partially evaluated degenerative changes in the lumbar spine. Otherwise negative. No evidence of fracture.   Lumbar spine XR, 2-3 views     Status: None    Narrative    EXAM: XR LUMBAR SPINE 2-3 VIEWS  LOCATION: [facility]  DATE/TIME: 2/10/2022 6:24 PM    INDICATION: fall, left posterior back pain  COMPARISON: None.  TECHNIQUE: CR  Lumbar Spine.      Impression    IMPRESSION: Chronic L1 osteoporotic fracture with 30% height loss with anterior wedging, status post vertebroplasty. Vertebral body heights otherwise preserved. Normal alignment. Severe degenerative disc disease at L5-S1. Moderate colonic loading;   correlate clinically for constipation.   CT Pelvis Bone wo Contrast     Status: None    Narrative    EXAM: CT PELVIS BONE WO CONTRAST  LOCATION: [facility]  DATE/TIME: 2/10/2022 7:37 PM    INDICATION: Hip trauma, fracture suspected, negative x-ray.  COMPARISON: 2/10/2022 radiographs.  TECHNIQUE: CT scan of the pelvis was performed without IV contrast. Multiplanar reformats were obtained. Dose reduction techniques were used.  CONTRAST: None.    FINDINGS:    BONES AND JOINTS: There is a minimally displaced comminuted fracture of the left greater trochanter. This does not appear to involve the intertrochanteric region or the femoral neck. There is up to 2 mm of displacement. No angulation.    There are moderate to severe degenerative changes in the visualized lumbar spine with central spinal stenosis and lateral recess stenosis at L4-L5 and L5-S1.    SOFT TISSUES: No intrapelvic soft tissue abnormalities are evident. No hematoma, cyst or mass. There is edema adjacent to the fracture. No gross muscle or tendon pathology.      Impression    IMPRESSION:  1.  Minimally displaced comminuted fracture of the left greater trochanter.  2.  Degenerative changes in the visualized lumbar spine with central spinal stenosis and lateral recess stenosis at L4-L5 and L5-S1.   Basic metabolic panel     Status: Normal   Result Value Ref Range    Sodium 133 133 - 144 mmol/L    Potassium 4.1 3.4 - 5.3 mmol/L    Chloride 99 94 - 109 mmol/L    Carbon Dioxide (CO2) 29 20 - 32 mmol/L    Anion Gap 5 3 - 14 mmol/L    Urea Nitrogen 16 7 - 30 mg/dL    Creatinine 0.70 0.66 - 1.25 mg/dL    Calcium 8.9 8.5 - 10.1 mg/dL    Glucose 97 70 - 99 mg/dL     GFR Estimate >90 >60 mL/min/1.73m2   CBC with platelets and differential     Status: Abnormal   Result Value Ref Range    WBC Count 7.5 4.0 - 11.0 10e3/uL    RBC Count 4.28 (L) 4.40 - 5.90 10e6/uL    Hemoglobin 13.5 13.3 - 17.7 g/dL    Hematocrit 40.1 40.0 - 53.0 %    MCV 94 78 - 100 fL    MCH 31.5 26.5 - 33.0 pg    MCHC 33.7 31.5 - 36.5 g/dL    RDW 11.8 10.0 - 15.0 %    Platelet Count 199 150 - 450 10e3/uL    % Neutrophils 80 %    % Lymphocytes 11 %    % Monocytes 7 %    % Eosinophils 1 %    % Basophils 1 %    % Immature Granulocytes 0 %    NRBCs per 100 WBC 0 <1 /100    Absolute Neutrophils 6.1 1.6 - 8.3 10e3/uL    Absolute Lymphocytes 0.8 0.8 - 5.3 10e3/uL    Absolute Monocytes 0.5 0.0 - 1.3 10e3/uL    Absolute Eosinophils 0.1 0.0 - 0.7 10e3/uL    Absolute Basophils 0.0 0.0 - 0.2 10e3/uL    Absolute Immature Granulocytes 0.0 <=0.4 10e3/uL    Absolute NRBCs 0.0 10e3/uL   Asymptomatic COVID-19 Virus (Coronavirus) by PCR Nasopharyngeal     Status: Normal    Specimen: Nasopharyngeal; Swab   Result Value Ref Range    SARS CoV2 PCR Negative Negative    Narrative    Testing was performed using the dianne  SARS-CoV-2 & Influenza A/B Assay on the dianne  Alea  System.  This test should be ordered for the detection of SARS-COV-2 in individuals who meet SARS-CoV-2 clinical and/or epidemiological criteria. Test performance is unknown in asymptomatic patients.  This test is for in vitro diagnostic use under the FDA EUA for laboratories certified under CLIA to perform moderate and/or high complexity testing. This test has not been FDA cleared or approved.  A negative test does not rule out the presence of PCR inhibitors in the specimen or target RNA in concentration below the limit of detection for the assay. The possibility of a false negative should be considered if the patient's recent exposure or clinical presentation suggests COVID-19.  [facility] are certified under the Clinical Laboratory  Improvement Amendments of 1988 (CLIA-88) as qualified to perform moderate and/or high complexity laboratory testing.   EKG 12-lead, tracing only     Status: None (Preliminary result)   Result Value Ref Range    Systolic Blood Pressure  mmHg    Diastolic Blood Pressure  mmHg    Ventricular Rate 59 BPM    Atrial Rate 59 BPM    UT Interval 184 ms    QRS Duration 90 ms     ms    QTc 427 ms    P Axis 73 degrees    R AXIS 68 degrees    T Axis 88 degrees    Interpretation ECG       Sinus bradycardia  Otherwise normal ECG  When compared with ECG of 22-MAY-2012 16:18,  No significant change was found     CBC with platelets + differential     Status: Abnormal    Narrative    The following orders were created for panel order CBC with platelets + differential.  Procedure                               Abnormality         Status                     ---------                               -----------         ------                     CBC with platelets and d...[[id number]]  Abnormal            Final result                 Please view results for these tests on the individual orders.          Attestation:  I have reviewed today's vital signs, notes, medications, labs and imaging with Dr. Brower.  Amount of time performed on this consult: 30 minutes.    Lia Hines PA-C

## 2022-02-11 NOTE — ED NOTES
[facility]  ED Nurse Handoff Report    ED Chief complaint: Fall (pt biba from home - slipped and fell on ice and hour PTA - c/o left hip pain, unable to bear weight. given 1mg dilaudid ivp en route. denies hitting head or loc. denies bloodthinners)      ED Diagnosis:   Final diagnoses:   None       Code Status: To be addressed by admitting MD    Allergies: No Known Allergies    Patient Story: pt biba from home - slipped and fell on ice and hour PTA - c/o left hip pain, unable to bear weight. given 1mg dilaudid ivp en route. denies hitting head or loc. denies bloodthinners  Focused Assessment:  Pt is an otherwise healthy independent elderly gentleman with vss, anox4, and unable to bear weight due to fall.    IMPRESSION:  1.  Minimally displaced comminuted fracture of the left greater trochanter.  2.  Degenerative changes in the visualized lumbar spine with central spinal stenosis and lateral recess stenosis at L4-L5 and L5-S1.    Treatments and/or interventions provided: Line, lab, imaging, meds.  Patient's response to treatments and/or interventions: pt resting calmly w/o complaint of pain at rest.     To be done/followed up on inpatient unit:  pain control, assist with adls. Possible surgery prep?    Does this patient have any cognitive concerns?: ANOx4    Activity level - Baseline/Home:  Independent  Activity Level - Current:   Stand with assist x2    Patient's Preferred language: English   Needed?: No    Isolation: None  Infection: Not Applicable  Patient tested for COVID 19 prior to admission: YES  Bariatric?: No    Vital Signs:   Vitals:    02/10/22 1809 02/10/22 1811   BP:  (!) 178/104   BP Location:  Right arm   Pulse: 60    Resp: 16    Temp: 97.7  F (36.5  C)    TempSrc: Oral    SpO2: 99%    Weight: 61.2 kg (135 lb)        Cardiac Rhythm:     Was the PSS-3 completed:   Yes  What interventions are required if any?               Family Comments:   OBS brochure/video  discussed/provided to patient/family: N/A              Name of person given brochure if not patient:               Relationship to patient:     For the majority of the shift this patient's behavior was Green.   Behavioral interventions performed were .    ED NURSE PHONE NUMBER: [phone]

## 2022-02-12 PROCEDURE — 99225 PR SUBSEQUENT OBSERVATION CARE,LEVEL II: CPT | Performed by: HOSPITALIST

## 2022-02-12 PROCEDURE — 250N000013 HC RX MED GY IP 250 OP 250 PS 637: Performed by: STUDENT IN AN ORGANIZED HEALTH CARE EDUCATION/TRAINING PROGRAM

## 2022-02-12 PROCEDURE — 120N000001 HC R&B MED SURG/OB

## 2022-02-12 PROCEDURE — 250N000013 HC RX MED GY IP 250 OP 250 PS 637: Performed by: HOSPITALIST

## 2022-02-12 PROCEDURE — G0378 HOSPITAL OBSERVATION PER HR: HCPCS

## 2022-02-12 PROCEDURE — 250N000013 HC RX MED GY IP 250 OP 250 PS 637: Performed by: PHYSICIAN ASSISTANT

## 2022-02-12 RX ORDER — CLONAZEPAM 0.5 MG/1
.5-1 TABLET ORAL AT BEDTIME
Qty: 30 TABLET | Refills: 0 | Status: SHIPPED | OUTPATIENT
Start: 2022-02-12

## 2022-02-12 RX ORDER — METHOCARBAMOL 500 MG/1
500 TABLET, FILM COATED ORAL 4 TIMES DAILY PRN
Status: DISCONTINUED | OUTPATIENT
Start: 2022-02-12 | End: 2022-02-14 | Stop reason: HOSPADM

## 2022-02-12 RX ORDER — OXYCODONE HYDROCHLORIDE 5 MG/1
5 TABLET ORAL EVERY 6 HOURS PRN
Qty: 20 TABLET | Refills: 0 | Status: SHIPPED | OUTPATIENT
Start: 2022-02-12 | End: 2022-02-14

## 2022-02-12 RX ADMIN — CLONAZEPAM 1 MG: 0.5 TABLET ORAL at 01:09

## 2022-02-12 RX ADMIN — ACETAMINOPHEN 650 MG: 325 TABLET, FILM COATED ORAL at 14:30

## 2022-02-12 RX ADMIN — ACETAMINOPHEN 650 MG: 325 TABLET, FILM COATED ORAL at 08:32

## 2022-02-12 RX ADMIN — OXYCODONE HYDROCHLORIDE 5 MG: 5 TABLET ORAL at 15:24

## 2022-02-12 RX ADMIN — NORTRIPTYLINE HYDROCHLORIDE 150 MG: 50 CAPSULE ORAL at 01:09

## 2022-02-12 RX ADMIN — OXYCODONE HYDROCHLORIDE 5 MG: 5 TABLET ORAL at 08:32

## 2022-02-12 RX ADMIN — METHOCARBAMOL 500 MG: 500 TABLET ORAL at 14:30

## 2022-02-12 RX ADMIN — OXYCODONE HYDROCHLORIDE 5 MG: 5 TABLET ORAL at 21:55

## 2022-02-12 RX ADMIN — METHOCARBAMOL 500 MG: 500 TABLET ORAL at 01:09

## 2022-02-12 NOTE — PROGRESS NOTES
[facility]    Hospitalist Progress Note    Date of Admission:  2/10/2022    Assessment & Plan     Reji Finley is a 71 year old male admitted on 2/10/2022. He presents with hip pain.        Closed displaced fracture of greater trochanter of left femur, initial encounter (H)    Fall due to slipping on ice or snow, initial encounter    Assessment: Presents with left hip pain in the setting of a mechanical fall.  CT pelvis shows minimally displaced comminuted fracture of the left greater trochanter.  Degenerative changes in the visualized lumbar spine with central spinal stenosis and lateral recess stenosis at L4-L5 and L5-S1.  Orthopedic surgery was consulted in emergency department, plan is nonsurgical at this time.    Plan:   -Admit to observation  -Orthopedic surgery consulted and recommended continued nonoperative management  -Protected WB with walker, no hip abduction, limited rotation  -Pain control as needed  -Fall precautions  -[facility] with [facility] in [facility] weeks,patient prefers [facility] [phone]  -PT    H/o recurrent syncope  Orthostatic hypotension  Raynaud's disease  Patient notes having history of syncopal events attributed to him being on nifedipine for vasospasm in fingers.  Has history of orthostatic hypotension.  -PT recommending TCU given patient's issues with orthostasis and that he lives alone, currently needing assist of 1, 24/7 for all mobility.  -Hold off on nifedipine, will not resume at discharge.       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       Seasonal affective disorder (H)    Other insomnia    Generalized anxiety disorder    Major depressive disorder, recurrent episode (H)    Assessment/Plan: Continue prior to admission nortriptyline and Klonopin      Chronic leg pain  Assessment/plan: Continue prior to admission Robaxin              Diet:   Regular Diet  DVT Prophylaxis: Pneumatic Compression  Devices  Koehler Catheter: Not present  Central Lines: None  Cardiac Monitoring: None  Code Status:   FULL CODE        Clinically Significant Risk Factors Present on Admission                              Disposition Plan     Expected Discharge:   Anticipate in the next 1 to 2 days pending therapy evaluation, pain control  Anticipated discharge location:  Awaiting care coordination huddle  Delays:                       Deena Tate MD  Text Page (7am - 6pm, M-F)  [facility]  Securely message with the Vocera Web Console (learn more here)  Text page via [employer] Paging/Directory      Interval History   Stable overnight.  Having a lot of pain in his hip, awaiting therapy evaluation.  Lives alone and is not sure he can make it at home by himself.  Also talks about syncopal events attributed to him being on nifedipine prescribed by his dermatologist for Raynaud's.  No chest pain or shortness of breath.    -Data reviewed today: I reviewed all new labs and imaging results over the last 24 hours. I personally reviewed CT scan with result as noted above    Physical Exam   Temp: 98.8  F (37.1  C) Temp src: Oral BP: 105/58 Pulse: 62   Resp: 18 SpO2: 97 % O2 Device: None (Room air)    Vitals:    02/10/22 1809 02/11/22 0642   Weight: 61.2 kg (135 lb) 59.3 kg (130 lb 11.7 oz)     Vital Signs with Ranges  Temp:  [98.1  F (36.7  C)-98.8  F (37.1  C)] 98.8  F (37.1  C)  Pulse:  [61-77] 62  Resp:  [14-20] 18  BP: (105-158)/() 105/58  SpO2:  [97 %-100 %] 97 %  I/O last 3 completed shifts:  In: 1123 [P.O.:1120; I.V.:3]  Out: 750 [Urine:750]    Constitutional: Alert, appears comfortable, in no acute distress  Respiratory: Non labored breathing, clear to auscultation bilaterally, no crackles or wheezes  Cardiovascular: Heart sounds regular rate and rhythm, no murmurs, no leg edema  GI: Abdomen is soft, non distended, non tender. Normal BS  Skin/Integumen: no rashes, no pressure sores  Neuro: alert, converses  appropriately, moving all extremities, fluent speech, no facial asymmetry  Psych: mood and affect appropriate      Medications       clonazePAM  0.5-1 mg Oral At Bedtime     lidocaine  1 patch Transdermal Q24H     lidocaine   Transdermal Q8H     nortriptyline  150 mg Oral At Bedtime     sodium chloride (PF)  3 mL Intracatheter Q8H       Data   Recent Labs   Lab 02/10/22  2024   WBC 7.5   HGB 13.5   MCV 94         POTASSIUM 4.1   CHLORIDE 99   CO2 29   BUN 16   CR 0.70   ANIONGAP 5   RADHA 8.9   GLC 97       Imaging  Recent Results (from the past 24 hour(s))   CT Pelvis Bone wo Contrast    Narrative    EXAM: CT PELVIS BONE WO CONTRAST  LOCATION: [facility]  DATE/TIME: 2/10/2022 7:37 PM    INDICATION: Hip trauma, fracture suspected, negative x-ray.  COMPARISON: 2/10/2022 radiographs.  TECHNIQUE: CT scan of the pelvis was performed without IV contrast. Multiplanar reformats were obtained. Dose reduction techniques were used.  CONTRAST: None.    FINDINGS:    BONES AND JOINTS: There is a minimally displaced comminuted fracture of the left greater trochanter. This does not appear to involve the intertrochanteric region or the femoral neck. There is up to 2 mm of displacement. No angulation.    There are moderate to severe degenerative changes in the visualized lumbar spine with central spinal stenosis and lateral recess stenosis at L4-L5 and L5-S1.    SOFT TISSUES: No intrapelvic soft tissue abnormalities are evident. No hematoma, cyst or mass. There is edema adjacent to the fracture. No gross muscle or tendon pathology.      Impression    IMPRESSION:  1.  Minimally displaced comminuted fracture of the left greater trochanter.  2.  Degenerative changes in the visualized lumbar spine with central spinal stenosis and lateral recess stenosis at L4-L5 and L5-S1.

## 2022-02-12 NOTE — PLAN OF CARE
A04. VS Q4. Pt requested to not have VS checked at 4am for restful sleep.   Lidocaine patch refused, Ice worked better. No abduction on L leg/hip.  Last Bm 2/9/22. Adequate fluid intake and output. VS stable. Methocarbamol order requested, and given. Pain 7/10-Oxy amd tylenol for pain. TBD discharge to pts home.

## 2022-02-12 NOTE — PROGRESS NOTES
Patient requesting PTA robaxin. Med list indicated he takes 1000 mg q hs though patient states he takes 500 qid prn. Had it ordered but now d/c'd. Progress note reviewed and indicates plan to continue robaxin.    Reordered Robaxin 500 mg tid prn. Hospitalist to address tomorrow    Sandra Mir PA-C

## 2022-02-12 NOTE — PROGRESS NOTES
[facility]    Hospitalist Progress Note    Date of Admission:  2/10/2022    Assessment & Plan     Reji Filney is a 71 year old male admitted on 2/10/2022. He presents with hip pain.        Closed displaced fracture of greater trochanter of left femur, initial encounter (H)    Fall due to slipping on ice or snow, initial encounter    Assessment: Presents with left hip pain in the setting of a mechanical fall.  CT pelvis shows minimally displaced comminuted fracture of the left greater trochanter.  Degenerative changes in the visualized lumbar spine with central spinal stenosis and lateral recess stenosis at L4-L5 and L5-S1.  Orthopedic surgery was consulted in emergency department, plan is nonsurgical at this time.    Plan:   -Admit to observation  -Orthopedic surgery consulted and recommended continued nonoperative management  -Protected WB with walker, no hip abduction, limited rotation  -Pain control as needed  -Fall precautions  -Follow-up with [facility] specialist in 2 weeks,patient prefers [facility] [phone]  -PT recommending TCU    H/o recurrent syncope  Orthostatic hypotension  Raynaud's disease  Patient notes having history of syncopal events attributed to him being on nifedipine for vasospasm in fingers.  Has history of orthostatic hypotension.  -PT recommending TCU given patient's issues with orthostasis and that he lives alone, currently needing assist of 1, 24/7 for all mobility.  -Hold off on nifedipine, will not resume at discharge.       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       Seasonal affective disorder (H)    Other insomnia    Generalized anxiety disorder    Major depressive disorder, recurrent episode (H)    Assessment/Plan: Continue prior to admission nortriptyline and Klonopin      Chronic leg pain  Assessment/plan: Continue prior to admission Robaxin              Diet:   Regular Diet  DVT Prophylaxis: Pneumatic  Compression Devices  Koehler Catheter: Not present  Central Lines: None  Cardiac Monitoring: None  Code Status:   FULL CODE        Clinically Significant Risk Factors Present on Admission                              Disposition Plan     Expected Discharge:   Medically stable to discharge, awaiting TCU placement  Anticipated [facility]  Delays:              Deena Tate MD  Text Page (7am - 6pm, M-F)  [facility]  Securely message with the Vocera Web Console (learn more here)  Text page via [email or web address] Paging/Directory      Interval History   Stable overnight.  Pain is better controlled.  Was awaiting reevaluation by therapies today.  Anticipating needing TCU.    -Data reviewed today: I reviewed all new labs and imaging results over the last 24 hours. I personally reviewed CT scan with result as noted above    Physical Exam   Temp: 98.7  F (37.1  C) Temp src: Oral BP: 126/78 Pulse: 67   Resp: 16 SpO2: 99 % O2 Device: None (Room air)    Vitals:    02/10/22 1809 02/11/22 0642 02/12/22 0309   Weight: 61.2 kg (135 lb) 59.3 kg (130 lb 11.7 oz) 61.5 kg (135 lb 9.3 oz)     Vital Signs with Ranges  Temp:  [97.7  F (36.5  C)-98.9  F (37.2  C)] 98.7  F (37.1  C)  Pulse:  [62-74] 67  Resp:  [16-17] 16  BP: (112-133)/(61-80) 126/78  SpO2:  [96 %-99 %] 99 %  I/O last 3 completed shifts:  In: 1200 [P.O.:1200]  Out: 1375 [Urine:1375]    Constitutional: Alert, appears comfortable, in no acute distress  Respiratory: Non labored breathing, clear to auscultation bilaterally, no crackles or wheezes  Cardiovascular: Heart sounds regular rate and rhythm, no murmurs, no leg edema  GI: Abdomen is soft, non distended, non tender. Normal BS  Skin/Integumen: no rashes, no pressure sores  Neuro: alert, converses appropriately, moving all extremities, fluent speech, no facial asymmetry  Psych: mood and affect appropriate      Medications       clonazePAM  0.5-1 mg Oral At Bedtime      lidocaine  1 patch Transdermal Q24H     lidocaine   Transdermal Q8H     nortriptyline  150 mg Oral At Bedtime     sodium chloride (PF)  3 mL Intracatheter Q8H       Data   Recent Labs   Lab 02/10/22  2024   WBC 7.5   HGB 13.5   MCV 94         POTASSIUM 4.1   CHLORIDE 99   CO2 29   BUN 16   CR 0.70   ANIONGAP 5   RADHA 8.9   GLC 97       Imaging  No results found for this or any previous visit (from the past 24 hour(s)).

## 2022-02-12 NOTE — PLAN OF CARE
Pt is Aox4, no cognitive concerns.Admitted as an observation patient with a fracture of the left trochanter. Observation goals are being met except pain mangement. Pt had a syncopal episode this morning while trying to ambulate and a RRT was called, pt returned to baseline quickly, EkG showed no abnormalities. PT/OT following-stated no abduction for the time. States pain is between a 6-7 most of the time but well controlled with PRN oxy and PRN roboxin. Oxy given x2, roboxin x2 and tylenol x1. Discharge is pending management of pain.

## 2022-02-12 NOTE — PROGRESS NOTES
Pt is Aox4, no cognitive concerns.Observation goals are being met except pain mangement.PT/OT following-stated no abduction for the time will not see again until Monday 2/14/22. States pain is a 6 most of the time but well controlled with PRN oxy and PRN roboxin. Oxy given x2, roboxin x1 and tylenol x2. Discharge is pending management of pain. Patients [birth year] is today.

## 2022-02-13 PROCEDURE — 99224 PR SUBSEQUENT OBSERVATION CARE,LEVEL I: CPT | Performed by: HOSPITALIST

## 2022-02-13 PROCEDURE — 250N000013 HC RX MED GY IP 250 OP 250 PS 637: Performed by: HOSPITALIST

## 2022-02-13 PROCEDURE — 250N000013 HC RX MED GY IP 250 OP 250 PS 637: Performed by: STUDENT IN AN ORGANIZED HEALTH CARE EDUCATION/TRAINING PROGRAM

## 2022-02-13 PROCEDURE — G0378 HOSPITAL OBSERVATION PER HR: HCPCS

## 2022-02-13 PROCEDURE — 120N000001 HC R&B MED SURG/OB

## 2022-02-13 RX ADMIN — OXYCODONE HYDROCHLORIDE 5 MG: 5 TABLET ORAL at 12:24

## 2022-02-13 RX ADMIN — ACETAMINOPHEN 650 MG: 325 TABLET, FILM COATED ORAL at 14:14

## 2022-02-13 RX ADMIN — OXYCODONE HYDROCHLORIDE 5 MG: 5 TABLET ORAL at 21:00

## 2022-02-13 RX ADMIN — NORTRIPTYLINE HYDROCHLORIDE 150 MG: 50 CAPSULE ORAL at 01:12

## 2022-02-13 RX ADMIN — OXYCODONE HYDROCHLORIDE 5 MG: 5 TABLET ORAL at 08:00

## 2022-02-13 RX ADMIN — ACETAMINOPHEN 650 MG: 325 TABLET, FILM COATED ORAL at 08:00

## 2022-02-13 RX ADMIN — CLONAZEPAM 1 MG: 0.5 TABLET ORAL at 01:12

## 2022-02-13 RX ADMIN — ACETAMINOPHEN 650 MG: 325 TABLET, FILM COATED ORAL at 21:00

## 2022-02-13 RX ADMIN — OXYCODONE HYDROCHLORIDE 5 MG: 5 TABLET ORAL at 01:40

## 2022-02-13 RX ADMIN — OXYCODONE HYDROCHLORIDE 5 MG: 5 TABLET ORAL at 16:40

## 2022-02-13 RX ADMIN — METHOCARBAMOL 500 MG: 500 TABLET ORAL at 01:12

## 2022-02-13 ASSESSMENT — ACTIVITIES OF DAILY LIVING (ADL)
ADLS_ACUITY_SCORE: 9

## 2022-02-13 NOTE — UTILIZATION REVIEW
Admission Status; Secondary Review Determination    Under the authority of the Utilization Management Committee, the utilization review process indicated a secondary review on the above patient. The review outcome is based on review of the medical records, discussions with staff, and applying clinical experience noted on the date of the review.    (x) Inpatient Status Appropriate - This patient's medical care is consistent with medical management for inpatient care and reasonable inpatient medical practice.    RATIONALE FOR DETERMINATION: 72-year-old male admitted 2 days before his birthday after falling on ice resulting in acute left hip pain unable to bear weight.  Patient was found to have a minimally displaced comminuted fracture of the left greater trochanter.  Patient was initially admitted under observation care to assess mobility and obtain adequate pain control.  Patient has history of mental health disease with anxiety and depression as well as chronic leg pain utilizing Robaxin.  Orthopedic consultation recommended protected weightbearing with walker with no hip abduction and limited rotation.  On hospital day 2 when patient was wearing a gait belt and using a walker with 2 staff members he became suddenly lightheaded and had a syncopal episode.  Patient stated that while in bed his pain was 7 out of 10 but when he was getting up it became more like 10 out of 10.  Patient does report history of syncopal episodes in the past when he was on nifedipine for peripheral extremity vasospasm.  Thus on hospital day 2 patient is inadequate pain control with mobility as well as episode of syncope, was not safe for discharge requiring greater than 2 nights in the hospital for optimal management appropriate for inpatient care.    At the time of admission with the information available to the attending physician more than 2 nights Hospital complex care was anticipated, based on patient risk of adverse outcome if  treated as outpatient and complex care required. Inpatient admission is appropriate based on the Medicare guidelines.    This document was produced using voice recognition software    The information on this document is developed by the utilization review team in order for the business office to ensure compliance. This only denotes the appropriateness of proper admission status and does not reflect the quality of care rendered.    The definitions of Inpatient Status and Observation Status used in making the determination above are those provided in the CMS Coverage Manual, Chapter 1 and Chapter 6, section 70.4.    Sincerely,    Cade Kingsley MD  Utilization Review  Physician Advisor  [facility].

## 2022-02-13 NOTE — PLAN OF CARE
A0x4, VS Q8. Saline locked. Ice for pain on L hip. Reg diet. Ax1. Voids in urinal during HS. Adequate output. No BM since 2/9, passing flatus. Pt wants to stay on schedule with PRN OXY for pain, so he can take his last dose at 1am. Day shift please try to give him dose at 5pm on your shift.

## 2022-02-13 NOTE — PROGRESS NOTES
[facility]    Hospitalist Progress Note    Date of Admission:  2/10/2022    Assessment & Plan     Reji Finley is a 71 year old male admitted on 2/10/2022. He presents with hip pain.        Closed displaced fracture of greater trochanter of left femur, initial encounter (H)    Fall due to slipping on ice or snow, initial encounter    Assessment: Presents with left hip pain in the setting of a mechanical fall.  CT pelvis shows minimally displaced comminuted fracture of the left greater trochanter.  Degenerative changes in the visualized lumbar spine with central spinal stenosis and lateral recess stenosis at L4-L5 and L5-S1.  Orthopedic surgery was consulted in emergency department, plan is nonsurgical at this time.    Plan:   -Admit to observation  -Orthopedic surgery consulted and recommended continued nonoperative management  -Protected WB with walker, no hip abduction, limited rotation  -Pain control as needed  -Fall precautions  -Follow-up with [facility] specialist in 2 weeks,patient prefers [facility] [phone]  -PT recommending TCU    H/o recurrent syncope  Orthostatic hypotension, syncopal episode on 2/11  Raynaud's disease  Patient notes having history of syncopal events attributed to him being on nifedipine for vasospasm in fingers.  Has history of orthostatic hypotension.  -PT recommending TCU given patient's issues with orthostasis and that he lives alone, currently needing assist of 1, 24/7 for all mobility.  -Patient did have a syncopal event on 2/11 when getting up with staff.  Apparently also had increased pain with activity, likely had vasovagal/orthostatic syncope.  -Hold off on nifedipine, will not resume at discharge.  -Given history of recurrent syncope, have ordered TTE.  Also monitor with telemetry.       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       Seasonal affective disorder (H)    Other insomnia    Generalized anxiety  disorder    Major depressive disorder, recurrent episode (H)    Assessment/Plan: Continue prior to admission nortriptyline and Klonopin      Chronic leg pain  Assessment/plan: Continue prior to admission Robaxin              Diet:   Regular Diet  DVT Prophylaxis: Pneumatic Compression Devices  Koehler Catheter: Not present  Central Lines: None  Cardiac Monitoring: None  Code Status:   FULL CODE        Clinically Significant Risk Factors Present on Admission                              Disposition Plan     Expected Discharge:   Medically stable to discharge, awaiting TCU placement  Anticipated discharge location:  Awaiting care coordination huddle  Delays:              Deena Tate MD  Text Page (7am - 6pm, M-F)  [facility]  Securely message with the Vocera Web Console (learn more here)  Text page via [employer] Paging/Directory      Interval History   Stable overnight.  Pain is better controlled.  Does not bring up any acute issues.  Obtaining TTE today for history of recurrent syncope.  Anticipate TCU discharge soon as tomorrow.    -Data reviewed today: I reviewed all new labs and imaging results over the last 24 hours. I personally reviewed CT scan with result as noted above    Physical Exam   Temp: 97.8  F (36.6  C) Temp src: Oral BP: 121/68 Pulse: 65   Resp: 16 SpO2: 98 % O2 Device: None (Room air)    Vitals:    02/10/22 1809 02/11/22 0642 02/12/22 0309   Weight: 61.2 kg (135 lb) 59.3 kg (130 lb 11.7 oz) 61.5 kg (135 lb 9.3 oz)     Vital Signs with Ranges  Temp:  [97.7  F (36.5  C)-99.2  F (37.3  C)] 97.8  F (36.6  C)  Pulse:  [] 65  Resp:  [16-18] 16  BP: (109-150)/(68-95) 121/68  SpO2:  [97 %-99 %] 98 %  I/O last 3 completed shifts:  In: 1480 [P.O.:1480]  Out: 1250 [Urine:1250]    Constitutional: Alert, appears comfortable, in no acute distress  Respiratory: Non labored breathing, clear to auscultation bilaterally, no crackles or wheezes  Cardiovascular: Heart sounds regular rate  and rhythm, no murmurs, no leg edema  GI: Abdomen is soft, non distended, non tender. Normal BS  Skin/Integumen: no rashes, no pressure sores  Neuro: alert, converses appropriately, moving all extremities, fluent speech, no facial asymmetry  Psych: mood and affect appropriate      Medications       clonazePAM  0.5-1 mg Oral At Bedtime     lidocaine  1 patch Transdermal Q24H     lidocaine   Transdermal Q8H     nortriptyline  150 mg Oral At Bedtime     sodium chloride (PF)  3 mL Intracatheter Q8H       Data   Recent Labs   Lab 02/10/22  2024   WBC 7.5   HGB 13.5   MCV 94         POTASSIUM 4.1   CHLORIDE 99   CO2 29   BUN 16   CR 0.70   ANIONGAP 5   RADHA 8.9   GLC 97       Imaging  No results found for this or any previous visit (from the past 24 hour(s)).

## 2022-02-13 NOTE — PROVIDER NOTIFICATION
Paged Dr. Tate about whether patient should rest until therapy sees him tomorrow or if we should try and get him to stand up?     Dr. Tate stated it would be beneficial to move him everyday unless patient becomes symptomatic (lightheaded/dizzy) then let him rest.

## 2022-02-13 NOTE — PROGRESS NOTES
Pt is Aox4, no cognitive concerns. Pt changed to inpatient status and placed on telemetry with EKG ordered. PT/OT following-stated no abduction for the time will not see again until Monday 2/14/22.Patient agreed to move out of bed this shift, up in chair with A2 lift sitting comfortably will let know when ready to move back to bed. States pain is a 6 most of the time but well controlled with PRN oxy and PRN tylenol. Oxy given x3 and tylenol x2. Discharge is pending management of pain.

## 2022-02-14 ENCOUNTER — APPOINTMENT (OUTPATIENT)
Dept: PHYSICAL THERAPY | Facility: CLINIC | Age: 72
DRG: 536 | End: 2022-02-14
Payer: MEDICARE

## 2022-02-14 ENCOUNTER — APPOINTMENT (OUTPATIENT)
Dept: CARDIOLOGY | Facility: CLINIC | Age: 72
DRG: 536 | End: 2022-02-14
Attending: HOSPITALIST
Payer: MEDICARE

## 2022-02-14 VITALS
HEART RATE: 64 BPM | HEIGHT: 70 IN | WEIGHT: 135.58 LBS | SYSTOLIC BLOOD PRESSURE: 124 MMHG | RESPIRATION RATE: 16 BRPM | OXYGEN SATURATION: 100 % | DIASTOLIC BLOOD PRESSURE: 76 MMHG | BODY MASS INDEX: 19.41 KG/M2 | TEMPERATURE: 98.1 F

## 2022-02-14 LAB — LVEF ECHO: NORMAL

## 2022-02-14 PROCEDURE — 97116 GAIT TRAINING THERAPY: CPT | Mod: GP

## 2022-02-14 PROCEDURE — 97110 THERAPEUTIC EXERCISES: CPT | Mod: GP

## 2022-02-14 PROCEDURE — 99217 PR OBSERVATION CARE DISCHARGE: CPT | Performed by: HOSPITALIST

## 2022-02-14 PROCEDURE — 250N000013 HC RX MED GY IP 250 OP 250 PS 637: Performed by: STUDENT IN AN ORGANIZED HEALTH CARE EDUCATION/TRAINING PROGRAM

## 2022-02-14 PROCEDURE — 93306 TTE W/DOPPLER COMPLETE: CPT

## 2022-02-14 PROCEDURE — 250N000013 HC RX MED GY IP 250 OP 250 PS 637: Performed by: HOSPITALIST

## 2022-02-14 PROCEDURE — 93306 TTE W/DOPPLER COMPLETE: CPT | Mod: 26 | Performed by: INTERNAL MEDICINE

## 2022-02-14 PROCEDURE — 97530 THERAPEUTIC ACTIVITIES: CPT | Mod: GP

## 2022-02-14 RX ORDER — ACETAMINOPHEN 325 MG/1
650 TABLET ORAL EVERY 6 HOURS PRN
DISCHARGE
Start: 2022-02-14

## 2022-02-14 RX ORDER — LIDOCAINE 4 G/G
1 PATCH TOPICAL EVERY 24 HOURS
Qty: 14 PATCH | Refills: 0 | DISCHARGE
Start: 2022-02-14 | End: 2022-02-28

## 2022-02-14 RX ORDER — OXYCODONE HYDROCHLORIDE 5 MG/1
5-10 TABLET ORAL EVERY 6 HOURS PRN
Qty: 30 TABLET | Refills: 0 | Status: SHIPPED | OUTPATIENT
Start: 2022-02-14

## 2022-02-14 RX ORDER — METHOCARBAMOL 500 MG/1
500 TABLET, FILM COATED ORAL 4 TIMES DAILY PRN
DISCHARGE
Start: 2022-02-14

## 2022-02-14 RX ORDER — OXYCODONE HYDROCHLORIDE 5 MG/1
5-10 TABLET ORAL EVERY 4 HOURS PRN
Status: DISCONTINUED | OUTPATIENT
Start: 2022-02-14 | End: 2022-02-14 | Stop reason: HOSPADM

## 2022-02-14 RX ADMIN — OXYCODONE HYDROCHLORIDE 5 MG: 5 TABLET ORAL at 10:16

## 2022-02-14 RX ADMIN — LIDOCAINE 1 PATCH: 246 PATCH TOPICAL at 03:31

## 2022-02-14 RX ADMIN — OXYCODONE HYDROCHLORIDE 5 MG: 5 TABLET ORAL at 00:56

## 2022-02-14 RX ADMIN — METHOCARBAMOL 500 MG: 500 TABLET ORAL at 16:08

## 2022-02-14 RX ADMIN — ACETAMINOPHEN 650 MG: 325 TABLET, FILM COATED ORAL at 10:16

## 2022-02-14 RX ADMIN — ACETAMINOPHEN 650 MG: 325 TABLET, FILM COATED ORAL at 16:08

## 2022-02-14 RX ADMIN — OXYCODONE HYDROCHLORIDE 10 MG: 5 TABLET ORAL at 14:23

## 2022-02-14 RX ADMIN — METHOCARBAMOL 500 MG: 500 TABLET ORAL at 10:16

## 2022-02-14 RX ADMIN — NORTRIPTYLINE HYDROCHLORIDE 150 MG: 50 CAPSULE ORAL at 00:57

## 2022-02-14 RX ADMIN — CLONAZEPAM 1 MG: 0.5 TABLET ORAL at 00:57

## 2022-02-14 RX ADMIN — OXYCODONE HYDROCHLORIDE 5 MG: 5 TABLET ORAL at 10:40

## 2022-02-14 RX ADMIN — ACETAMINOPHEN 650 MG: 325 TABLET, FILM COATED ORAL at 03:32

## 2022-02-14 RX ADMIN — METHOCARBAMOL 500 MG: 500 TABLET ORAL at 01:02

## 2022-02-14 ASSESSMENT — ACTIVITIES OF DAILY LIVING (ADL)
ADLS_ACUITY_SCORE: 9
ADLS_ACUITY_SCORE: 7
ADLS_ACUITY_SCORE: 9
ADLS_ACUITY_SCORE: 7
ADLS_ACUITY_SCORE: 9
ADLS_ACUITY_SCORE: 8
ADLS_ACUITY_SCORE: 9
ADLS_ACUITY_SCORE: 8
ADLS_ACUITY_SCORE: 9
ADLS_ACUITY_SCORE: 7
ADLS_ACUITY_SCORE: 8
ADLS_ACUITY_SCORE: 7
ADLS_ACUITY_SCORE: 9

## 2022-02-14 NOTE — PROGRESS NOTES
Care Management Discharge Note    Discharge Date: 02/15/2022       Discharge Disposition: Transitional Care    Discharge Services: None    Discharge DME: None    Discharge Transportation: agency    Private pay costs discussed: transportation costs    PAS Confirmation Code:  [id number]  Patient/family educated on Medicare website which has current facility and service quality ratings: no    Education Provided on the Discharge Plan:  yes  Persons Notified of Discharge Plans: patient  Patient/Family in Agreement with the Plan: yes    Handoff Referral Completed: Yes    Additional Information:  Received discharge orders. Bed available at [facility]. [facility] w/c transport ride set up for 1800. Faxed orders to [facility]. Completed PAS, put in chart and let facility know PAS number. Let patient know about time of discharge.     PAS-RR    D: Per DHS regulation, SW completed and submitted PAS-RR to [place] [employer] via the [employer].  PAS-RR confirmation # is : [id number]    I: SW spoke with patient and they are aware a PAS-RR has been submitted.  SW reviewed with patient that they may be contacted for a follow up appointment within 10 days of hospital discharge if their SNF stay is < 30 days.  Contact information for [employer] was also provided.    A: Patient verbalized understanding.    P: Further questions may be directed to [employer] at #[phone], option #4 for PAS-RR staff.    REINA Calles

## 2022-02-14 NOTE — PLAN OF CARE
A04, Reg diet. VS Q8. Ice to relieve L hip pain. VS stable. Lungs clear. Oxycodone Q4 for pain PRN. Tele- NSR. Last BM at 445am.  Got up earlier in HS onto commode, but was painful after on his L hip. Input and output is adequate. CMS intact. Baseline numbness in legs that comes and goes, movement pain exacerbates the numbness. Lidocaine patch placed on L hip, pt complained of 10/10 pain. Tylenol 650mg given. Awaiting placement to TCU until stronger to return home.

## 2022-02-14 NOTE — DISCHARGE SUMMARY
Discharge Summary  Hospitalist    Date of Admission:  2/10/2022  Date of Discharge:  2/14/2022  Discharging Provider: Deena Tate MD  Date of Service (when I saw the patient): 02/14/22    Discharge Diagnoses     Closed displaced fracture of greater trochanter of left femur, initial encounter (H)    Fall due to slipping on ice or snow, initial encounter    History of Present Illness   Please refer H & P for details.      Hospital Course     Reji Finley is a 71 year old male admitted on 2/10/2022. He presents with hip pain.        Closed displaced fracture of greater trochanter of left femur, initial encounter (H)    Fall due to slipping on ice or snow, initial encounter    Assessment: Presents with left hip pain in the setting of a mechanical fall.  CT pelvis shows minimally displaced comminuted fracture of the left greater trochanter.  Degenerative changes in the visualized lumbar spine with central spinal stenosis and lateral recess stenosis at L4-L5 and L5-S1.  Orthopedic surgery was consulted in emergency department, plan is nonsurgical at this time.    Plan:   -Orthopedic surgery consulted and recommended continued nonoperative management  -Protected WB with walker, no hip abduction, limited rotation  -Pain control as needed-as needed Tylenol, Robaxin, oxycodone  -Fall precautions  -Follow-up with [facility] specialist in 2 weeks,patient prefers [facility] [phone]  -PT recommending TCU-patient is discharged to TCU for further rehab in stable condition.     H/o recurrent syncope  Orthostatic hypotension, syncopal episode on 2/11  Raynaud's disease  Patient notes having history of syncopal events attributed to him being on nifedipine for vasospasm in fingers.  Has history of orthostatic hypotension.  -PT recommending TCU given patient's issues with orthostasis and that he lives alone, currently needing assist of 1, 24/7 for all mobility.  -Patient did have a syncopal event on 2/11 when getting up with  staff.  Apparently also had increased pain with activity, likely had vasovagal/orthostatic syncope.  -Hold off on nifedipine, will not resume at discharge.  -Given history of recurrent syncope, have ordered TTE.  Also monitor with telemetry-no acute issues identified.  TTE results still pending at time of discharge.       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       Seasonal affective disorder (H)    Other insomnia    Generalized anxiety disorder    Major depressive disorder, recurrent episode (H)    Assessment/Plan: Continue prior to admission nortriptyline and Klonopin      Chronic leg pain  Assessment/plan: Continue prior to admission Pacheco Tate MD, MD      Pending Results   These results will be followed up by Hospitalist team.  Unresulted Labs Ordered in the Past 30 Days of this Admission     No orders found from 1/11/2022 to 2/11/2022.          Code Status   Full Code       Primary Care Physician   Cece Vazquez    Follow-ups Needed After Discharge   Follow-up Appointments     Follow Up and recommended labs and tests      Follow up with Nursing home physician.  No follow up labs or test are   needed.             Physical Exam   Temp: 97.9  F (36.6  C) Temp src: Oral BP: 134/85 Pulse: 65   Resp: 16 SpO2: 98 % O2 Device: None (Room air)    Vitals:    02/10/22 1809 02/11/22 0642 02/12/22 0309   Weight: 61.2 kg (135 lb) 59.3 kg (130 lb 11.7 oz) 61.5 kg (135 lb 9.3 oz)     Vital Signs with Ranges  Temp:  [97.8  F (36.6  C)-98.7  F (37.1  C)] 97.9  F (36.6  C)  Pulse:  [61-65] 65  Resp:  [16-18] 16  BP: (114-143)/(68-85) 134/85  SpO2:  [98 %-99 %] 98 %  I/O last 3 completed shifts:  In: 1120 [P.O.:1120]  Out: 1525 [Urine:1525]    Constitutional: Alert, appears comfortable, in no acute distress  Respiratory: Non labored breathing, clear to auscultation bilaterally, no crackles or wheezes  Cardiovascular: Heart sounds regular rate and rhythm, no  murmurs, no leg edema  GI: Abdomen is soft, non distended, non tender. Normal BS  Skin/Integumen: no rashes, no pressure sores  Neuro: alert, converses appropriately, moving all extremities, fluent speech, no facial asymmetry  Psych: mood and affect appropriate                Discharge Disposition   Discharged to short-term care facility  Condition at discharge: Stable    Consultations This Hospital Stay   PHYSICAL THERAPY ADULT IP CONSULT  ORTHOPEDIC SURGERY IP CONSULT  CARE MANAGEMENT / SOCIAL WORK IP CONSULT  PHYSICAL THERAPY ADULT IP CONSULT  OCCUPATIONAL THERAPY ADULT IP CONSULT    Time Spent on this Encounter   IDeena MD, personally saw the patient today and spent greater than 30 minutes discharging this patient.    Discharge Orders      General info for SNF    Length of Stay Estimate: Short Term Care: Estimated # of Days <30  Condition at Discharge: Improving  Level of care:skilled   Rehabilitation Potential: Good  Admission H&P remains valid and up-to-date: Yes  Recent Chemotherapy: N/A  Use Nursing Home Standing Orders: Yes     Mantoux instructions    Give two-step Mantoux (PPD) Per Facility Policy Yes     Follow Up and recommended labs and tests    Follow up with Nursing home physician.  No follow up labs or test are needed.     Reason for your hospital stay    Fall resulting in left greater trochanteric fracture     Activity - Up with nursing assistance     Full Code     Physical Therapy Adult Consult    Evaluate and treat as clinically indicated.    Reason: Weakness     Occupational Therapy Adult Consult    Evaluate and treat as clinically indicated.    Reason: Weakness     Fall precautions     Diet    Follow this diet upon discharge: Orders Placed This Encounter      Regular Diet Adult     Discharge Medications   Current Discharge Medication List      START taking these medications    Details   acetaminophen (TYLENOL) 325 MG tablet Take 2 tablets (650 mg) by mouth every 6 hours as needed for  mild pain or other (and adjunct with moderate or severe pain or per patient request)    Associated Diagnoses: Fall due to slipping on ice or snow, initial encounter      Lidocaine (LIDOCARE) 4 % Patch Place 1 patch onto the skin every 24 hours for 14 days To prevent lidocaine toxicity, patient should be patch free for 12 hrs daily.  Qty: 14 patch, Refills: 0    Associated Diagnoses: Fall due to slipping on ice or snow, initial encounter      oxyCODONE (ROXICODONE) 5 MG tablet Take 1-2 tablets (5-10 mg) by mouth every 6 hours as needed for moderate to severe pain  Qty: 30 tablet, Refills: 0    Associated Diagnoses: Fall due to slipping on ice or snow, initial encounter         CONTINUE these medications which have CHANGED    Details   clonazePAM (KLONOPIN) 0.5 MG tablet Take 1-2 tablets (0.5-1 mg) by mouth At Bedtime  Qty: 30 tablet, Refills: 0    Associated Diagnoses: Generalized anxiety disorder      methocarbamol (ROBAXIN) 500 MG tablet Take 1 tablet (500 mg) by mouth 4 times daily as needed for muscle spasms    Associated Diagnoses: Fall due to slipping on ice or snow, initial encounter         CONTINUE these medications which have NOT CHANGED    Details   medical cannabis (Patient's own supply) See Admin Instructions (The purpose of this order is to document that the patient reports taking medical cannabis.  This is not a prescription, and is not used to certify that the patient has a qualifying medical condition.)      nortriptyline (PAMELOR) 75 MG capsule Take 150 mg by mouth At Bedtime           Allergies   No Known Allergies  Data   Most Recent 3 CBC's:  Recent Labs   Lab Test 02/10/22  2024   WBC 7.5   HGB 13.5   MCV 94         Most Recent 3 BMP's:  Recent Labs   Lab Test 02/10/22  2024      POTASSIUM 4.1   CHLORIDE 99   CO2 29   BUN 16   CR 0.70   ANIONGAP 5   RADHA 8.9   GLC 97     Most Recent 2 LFT's:No lab results found.  Most Recent INR's and Anticoagulation Dosing History:  Anticoagulation  Dose History    There is no flowsheet data to display.       Most Recent 3 Troponin's:No lab results found.  Most Recent Cholesterol Panel:No lab results found.  Most Recent 6 Bacteria Isolates From Any Culture (See EPIC Reports for Culture Details):No lab results found.  Most Recent TSH, T4 and A1c Labs:No lab results found.    Results for orders placed or performed during the hospital encounter of 02/10/22   XR Pelvis w Hip Left 1 View    Narrative    EXAM: XR PELVIS AND HIP LEFT 1 VIEW  LOCATION: [facility]  DATE/TIME: 2/10/2022 6:24 PM    INDICATION: Fall, left hip pain.  COMPARISON: None.      Impression    IMPRESSION: Partially evaluated degenerative changes in the lumbar spine. Otherwise negative. No evidence of fracture.   Lumbar spine XR, 2-3 views    Narrative    EXAM: XR LUMBAR SPINE 2-3 VIEWS  LOCATION: [facility]  DATE/TIME: 2/10/2022 6:24 PM    INDICATION: fall, left posterior back pain  COMPARISON: None.  TECHNIQUE: CR Lumbar Spine.      Impression    IMPRESSION: Chronic L1 osteoporotic fracture with 30% height loss with anterior wedging, status post vertebroplasty. Vertebral body heights otherwise preserved. Normal alignment. Severe degenerative disc disease at L5-S1. Moderate colonic loading;   correlate clinically for constipation.   CT Pelvis Bone wo Contrast    Narrative    EXAM: CT PELVIS BONE WO CONTRAST  LOCATION: [facility]  DATE/TIME: 2/10/2022 7:37 PM    INDICATION: Hip trauma, fracture suspected, negative x-ray.  COMPARISON: 2/10/2022 radiographs.  TECHNIQUE: CT scan of the pelvis was performed without IV contrast. Multiplanar reformats were obtained. Dose reduction techniques were used.  CONTRAST: None.    FINDINGS:    BONES AND JOINTS: There is a minimally displaced comminuted fracture of the left greater trochanter. This does not appear to involve the intertrochanteric region or the femoral neck. There is up  to 2 mm of displacement. No angulation.    There are moderate to severe degenerative changes in the visualized lumbar spine with central spinal stenosis and lateral recess stenosis at L4-L5 and L5-S1.    SOFT TISSUES: No intrapelvic soft tissue abnormalities are evident. No hematoma, cyst or mass. There is edema adjacent to the fracture. No gross muscle or tendon pathology.      Impression    IMPRESSION:  1.  Minimally displaced comminuted fracture of the left greater trochanter.  2.  Degenerative changes in the visualized lumbar spine with central spinal stenosis and lateral recess stenosis at L4-L5 and L5-S1.

## 2022-02-14 NOTE — CONSULTS
Care Management Initial Consult    General Information  Assessment completed with: Patient,    Type of CM/SW Visit: Initial Assessment    Primary Care Provider verified and updated as needed: Yes   Readmission within the last 30 days: no previous admission in last 30 days      Reason for Consult: discharge planning  Advance Care Planning: Advance Care Planning Reviewed: other (comment) (No acp docs)          Communication Assessment  Patient's communication style: spoken language (English or Bilingual)    Hearing Difficulty or Deaf: no   Wear Glasses or Blind: no    Cognitive  Cognitive/Neuro/Behavioral: WDL                      Living Environment:   People in home: alone     Current living Arrangements: house      Able to return to prior arrangements: yes       Family/Social Support:  Care provided by: self  Provides care for: no one  Marital Status: Single  Children          Description of Support System: Supportive,Involved    Support Assessment: Adequate family and caregiver support,Adequate social supports    Current Resources:   Patient receiving home care services: No     Community Resources: None  Equipment currently used at home: none  Supplies currently used at home: None    Employment/Financial:  Employment Status: retired        Financial Concerns:             Lifestyle & Psychosocial Needs:  Social Determinants of Health     Tobacco Use: Unknown     Smoking Tobacco Use: Never Smoker     Smokeless Tobacco Use: Unknown   Alcohol Use: Not on file   Financial Resource Strain: Not on file   Food Insecurity: Not on file   Transportation Needs: Not on file   Physical Activity: Not on file   Stress: Not on file   Social Connections: Not on file   Intimate Partner Violence: Not on file   Depression: Not on file   Housing Stability: Not on file       Functional Status:  Prior to admission patient needed assistance:              Mental Health Status:          Chemical Dependency Status:                 Values/Beliefs:  Spiritual, Cultural Beliefs, Adventism Practices, Values that affect care: no               Additional Information:  Per care management/social work consult for discharge planning, patient was admitted on 2/10/2022 with closed displaced fracture of greater trochanter of left femur. Tentative date of discharge is 2/14/2022. Reviewed PT's note and saw that TCU is being recommended. Talked to patient and he said that he lives by himself in a two story house. He said that he was independent before coming to the hospital. Patient said that he wants tcu referrals to be sent in [place]. Told him writer would send out [facility], [facility], [facility], and [facility]. He said that he would need a ride at discharge. Patient is aware of cost and is fine with this.    Sent referrals via DOD to referrals listed above.    [facility] and [facility] accepted in [facility]. Left message for [facility] asking when they could take patient. [facility] said they could take patient today in a double room. Talked to patient and asked where he would want to go. He said he wanted to think about it and get back to writer.    Will continue to follow.    REINA Calles

## 2022-02-14 NOTE — PROGRESS NOTES
Pt A&O x4, up x2 with walker and GB. VSS. Pain managed with Oxy, Tyl, and Robaxin. Adequate I&O. Pt transfer to TCU at 6 PM with w/c transportation.

## 2022-02-15 ENCOUNTER — PATIENT OUTREACH (OUTPATIENT)
Dept: CARE COORDINATION | Facility: CLINIC | Age: 72
End: 2022-02-15
Payer: COMMERCIAL

## 2022-02-15 DIAGNOSIS — Z71.89 OTHER SPECIFIED COUNSELING: ICD-10-CM

## 2022-02-15 LAB
ATRIAL RATE - MUSE: 59 BPM
DIASTOLIC BLOOD PRESSURE - MUSE: NORMAL MMHG
INTERPRETATION ECG - MUSE: NORMAL
P AXIS - MUSE: 73 DEGREES
PR INTERVAL - MUSE: 184 MS
QRS DURATION - MUSE: 90 MS
QT - MUSE: 432 MS
QTC - MUSE: 427 MS
R AXIS - MUSE: 68 DEGREES
SYSTOLIC BLOOD PRESSURE - MUSE: NORMAL MMHG
T AXIS - MUSE: 88 DEGREES
VENTRICULAR RATE- MUSE: 59 BPM

## 2022-02-15 NOTE — PROGRESS NOTES
Clinic Care Coordination Contact    Background: Care Coordination referral placed from [facility] discharge report for reason of patient meeting criteria for a TCM outreach call by [facility] team.    Assessment: Upon chart review, [facility] Team member will cancel/close the referral for TCM outreach due to reason below:    Patient is not established within [facility]. Upon chart review, [facility] Team member noted patient discharged to TCU    Plan: Care Coordination referral for TCM outreach canceled.    Siobhan Cohen MA  [facility], [facility]

## 2025-03-05 ENCOUNTER — OFFICE VISIT (OUTPATIENT)
Dept: URGENT CARE | Facility: URGENT CARE | Age: 75
End: 2025-03-05
Payer: MEDICARE

## 2025-03-05 VITALS
RESPIRATION RATE: 16 BRPM | SYSTOLIC BLOOD PRESSURE: 162 MMHG | DIASTOLIC BLOOD PRESSURE: 83 MMHG | HEART RATE: 58 BPM | BODY MASS INDEX: 20.78 KG/M2 | OXYGEN SATURATION: 99 % | TEMPERATURE: 97.9 F | WEIGHT: 144.8 LBS

## 2025-03-05 DIAGNOSIS — G44.209 TENSION HEADACHE: Primary | ICD-10-CM

## 2025-03-05 DIAGNOSIS — R11.0 NAUSEA: ICD-10-CM

## 2025-03-05 RX ORDER — ONDANSETRON 4 MG/1
4 TABLET, ORALLY DISINTEGRATING ORAL EVERY 8 HOURS PRN
Qty: 21 TABLET | Refills: 0 | Status: SHIPPED | OUTPATIENT
Start: 2025-03-05 | End: 2025-03-12

## 2025-03-05 RX ORDER — TRAMADOL HYDROCHLORIDE 50 MG/1
TABLET ORAL
COMMUNITY

## 2025-03-05 RX ORDER — LISINOPRIL 20 MG/1
20 TABLET ORAL DAILY
COMMUNITY

## 2025-03-05 RX ORDER — KETOCONAZOLE 20 MG/ML
SHAMPOO, SUSPENSION TOPICAL
COMMUNITY
Start: 2024-11-12

## 2025-03-05 RX ORDER — KETOCONAZOLE 20 MG/G
CREAM TOPICAL
COMMUNITY
Start: 2023-11-06

## 2025-03-05 RX ORDER — KETOROLAC TROMETHAMINE 30 MG/ML
30 INJECTION, SOLUTION INTRAMUSCULAR; INTRAVENOUS ONCE
Status: COMPLETED | OUTPATIENT
Start: 2025-03-05 | End: 2025-03-05

## 2025-03-05 RX ORDER — ONDANSETRON 4 MG/1
4 TABLET, ORALLY DISINTEGRATING ORAL ONCE
Status: COMPLETED | OUTPATIENT
Start: 2025-03-05 | End: 2025-03-05

## 2025-03-05 RX ORDER — NIFEDIPINE 30 MG
1 TABLET, EXTENDED RELEASE ORAL DAILY
COMMUNITY
Start: 2025-01-24 | End: 2026-01-24

## 2025-03-05 RX ORDER — CYCLOBENZAPRINE HCL 10 MG
TABLET ORAL
COMMUNITY

## 2025-03-05 RX ORDER — MORPHINE SULFATE 15 MG/1
15 TABLET ORAL
COMMUNITY

## 2025-03-05 RX ORDER — MIRTAZAPINE 15 MG/1
TABLET, FILM COATED ORAL
COMMUNITY
Start: 2025-02-18

## 2025-03-05 RX ADMIN — KETOROLAC TROMETHAMINE 30 MG: 30 INJECTION, SOLUTION INTRAMUSCULAR; INTRAVENOUS at 13:51

## 2025-03-05 RX ADMIN — ONDANSETRON 4 MG: 4 TABLET, ORALLY DISINTEGRATING ORAL at 13:51

## 2025-03-05 NOTE — PROGRESS NOTES
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 Tension headache  G44.209 ketorolac (TORADOL) injection 30 mg      2. Nausea  R11.0 ondansetron (ZOFRAN ODT) ODT tab 4 mg     ondansetron (ZOFRAN ODT) 4 MG ODT tab      Toradol injection was given and after 25 minutes patient's headache began to improve and nausea was relieved with ondansetron.  No NSAIDs for the next 8 hours.    Deep tissue massage.  Heat and/or ice.  Acetaminophen or ibuprofen as needed for pain.    Red flag warning signs (signs of stroke) and when to go to the emergency room discussed.  Reviewed potential adverse reactions to medications.    Subjective     Reji Finley is an 75 year old male who presents to clinic today for headache for 3 days in the back of head. He also has a history of cervical arthritis and thinks the change in weather has also flared this up. Tried Tylenol without relief.  He does have Morphine and Tramadol, but doesn't take the Morphine    Also uses medical Cannabis but this has not been helpful    He does have chronic back and neck pain.  He denies any signs of CVA, falls, head trauma, fever or chills, visual changes.         Objective    BP (!) 162/83 (BP Location: Left arm, Patient Position: Sitting, Cuff Size: Adult Regular)   Pulse 58   Temp 97.9  F (36.6  C) (Oral)   Resp 16   Wt 65.7 kg (144 lb 12.8 oz)   SpO2 99%   BMI 20.78 kg/m    Nurses notes and VS have been reviewed.    Physical Exam       GENERAL APPEARANCE: healthy appearing, alert     EYES: PERRL, EOMI, sclera non-icteric     HENT: oral exam benign, mucus membranes intact, without ulcers or lesions     NECK: no adenopathy or asymmetry, thyroid normal to palpation     RESP: lungs clear to auscultation - no rales, rhonchi or wheezes     CV: regular  rates and rhythm, no murmurs, rubs, or gallop     ABDOMEN:  soft, nontender, no HSM or masses and bowel sounds normal     MS: extremities normal- no gross deformities noted; normal muscle tone.     SKIN: no suspicious lesions or rashes     NEURO: cranial nerves 2-12 intact, alert, normal neuro exam     PSYCH: normal thought process; no significant mood disturbance      KATINA Aburto, CNP  [facility] Provider    The use of Dragon/[employer] dictation services may have been used to construct the content in this note; any grammatical or spelling errors are non-intentional. Please contact the author of this note directly if you are in need of any clarification.

## 2025-03-05 NOTE — PATIENT INSTRUCTIONS
Take your flexeril (cyclobenzaprine) every 8 hours as needed for headache.    No Ibuprofen, Aleve, Naproxen, Advil, until at least 8pm tonight.    May otherwise take 3 tablets of Ibuprofen (600mg) every 8 hours with food as needed. Do not take if nauseated.    Acetaminophen 1000mg every 8 hours as needed for pain.    Deep tissue massage of upper back and neck.